# Patient Record
Sex: FEMALE | Race: WHITE | Employment: OTHER | URBAN - METROPOLITAN AREA
[De-identification: names, ages, dates, MRNs, and addresses within clinical notes are randomized per-mention and may not be internally consistent; named-entity substitution may affect disease eponyms.]

---

## 2019-01-01 ENCOUNTER — APPOINTMENT (OUTPATIENT)
Dept: CT IMAGING | Facility: CLINIC | Age: 84
DRG: 023 | End: 2019-01-01
Attending: PSYCHIATRY & NEUROLOGY
Payer: COMMERCIAL

## 2019-01-01 ENCOUNTER — APPOINTMENT (OUTPATIENT)
Dept: INTERVENTIONAL RADIOLOGY/VASCULAR | Facility: CLINIC | Age: 84
DRG: 023 | End: 2019-01-01
Attending: PSYCHIATRY & NEUROLOGY
Payer: COMMERCIAL

## 2019-01-01 ENCOUNTER — APPOINTMENT (OUTPATIENT)
Dept: CT IMAGING | Facility: CLINIC | Age: 84
End: 2019-01-01
Attending: EMERGENCY MEDICINE
Payer: COMMERCIAL

## 2019-01-01 ENCOUNTER — APPOINTMENT (OUTPATIENT)
Dept: MRI IMAGING | Facility: CLINIC | Age: 84
DRG: 023 | End: 2019-01-01
Attending: INTERNAL MEDICINE
Payer: COMMERCIAL

## 2019-01-01 ENCOUNTER — APPOINTMENT (OUTPATIENT)
Dept: CARDIOLOGY | Facility: CLINIC | Age: 84
DRG: 023 | End: 2019-01-01
Attending: INTERNAL MEDICINE
Payer: COMMERCIAL

## 2019-01-01 ENCOUNTER — ANESTHESIA (OUTPATIENT)
Dept: INTERVENTIONAL RADIOLOGY/VASCULAR | Facility: CLINIC | Age: 84
DRG: 023 | End: 2019-01-01
Payer: COMMERCIAL

## 2019-01-01 ENCOUNTER — DOCUMENTATION ONLY (OUTPATIENT)
Dept: OTHER | Facility: CLINIC | Age: 84
End: 2019-01-01

## 2019-01-01 ENCOUNTER — APPOINTMENT (OUTPATIENT)
Dept: GENERAL RADIOLOGY | Facility: CLINIC | Age: 84
DRG: 023 | End: 2019-01-01
Attending: SURGERY
Payer: COMMERCIAL

## 2019-01-01 ENCOUNTER — APPOINTMENT (OUTPATIENT)
Dept: GENERAL RADIOLOGY | Facility: CLINIC | Age: 84
End: 2019-01-01
Attending: EMERGENCY MEDICINE
Payer: COMMERCIAL

## 2019-01-01 ENCOUNTER — HOSPITAL ENCOUNTER (EMERGENCY)
Facility: CLINIC | Age: 84
Discharge: ANOTHER HOSPITAL | End: 2019-08-03
Attending: EMERGENCY MEDICINE | Admitting: EMERGENCY MEDICINE
Payer: COMMERCIAL

## 2019-01-01 ENCOUNTER — HOSPITAL ENCOUNTER (INPATIENT)
Facility: CLINIC | Age: 84
LOS: 9 days | DRG: 023 | End: 2019-08-13
Attending: INTERNAL MEDICINE | Admitting: INTERNAL MEDICINE
Payer: COMMERCIAL

## 2019-01-01 VITALS
SYSTOLIC BLOOD PRESSURE: 150 MMHG | DIASTOLIC BLOOD PRESSURE: 100 MMHG | HEART RATE: 112 BPM | OXYGEN SATURATION: 100 % | RESPIRATION RATE: 18 BRPM | WEIGHT: 155 LBS | TEMPERATURE: 97 F

## 2019-01-01 VITALS
SYSTOLIC BLOOD PRESSURE: 107 MMHG | HEIGHT: 64 IN | OXYGEN SATURATION: 88 % | DIASTOLIC BLOOD PRESSURE: 30 MMHG | RESPIRATION RATE: 42 BRPM | BODY MASS INDEX: 29.17 KG/M2 | HEART RATE: 106 BPM | WEIGHT: 170.86 LBS | TEMPERATURE: 98.1 F

## 2019-01-01 DIAGNOSIS — I63.412 CEREBROVASCULAR ACCIDENT (CVA) DUE TO EMBOLISM OF LEFT MIDDLE CEREBRAL ARTERY (H): ICD-10-CM

## 2019-01-01 LAB
ALBUMIN SERPL-MCNC: 3.1 G/DL (ref 3.4–5)
ALBUMIN UR-MCNC: 100 MG/DL
ALP SERPL-CCNC: 60 U/L (ref 40–150)
ALT SERPL W P-5'-P-CCNC: 18 U/L (ref 0–50)
ANION GAP SERPL CALCULATED.3IONS-SCNC: 5 MMOL/L (ref 3–14)
ANION GAP SERPL CALCULATED.3IONS-SCNC: 6 MMOL/L (ref 3–14)
ANION GAP SERPL CALCULATED.3IONS-SCNC: 7 MMOL/L (ref 3–14)
ANION GAP SERPL CALCULATED.3IONS-SCNC: 8 MMOL/L (ref 3–14)
ANION GAP SERPL CALCULATED.3IONS-SCNC: 9 MMOL/L (ref 3–14)
APPEARANCE UR: ABNORMAL
AST SERPL W P-5'-P-CCNC: 30 U/L (ref 0–45)
BACTERIA SPEC CULT: ABNORMAL
BASE EXCESS BLDV CALC-SCNC: 0.2 MMOL/L
BASE EXCESS BLDV CALC-SCNC: 0.4 MMOL/L
BASE EXCESS BLDV CALC-SCNC: 3.8 MMOL/L
BASOPHILS # BLD AUTO: 0.1 10E9/L (ref 0–0.2)
BASOPHILS NFR BLD AUTO: 0.5 %
BILIRUB SERPL-MCNC: 0.5 MG/DL (ref 0.2–1.3)
BILIRUB UR QL STRIP: NEGATIVE
BUN SERPL-MCNC: 13 MG/DL (ref 7–30)
BUN SERPL-MCNC: 19 MG/DL (ref 7–30)
BUN SERPL-MCNC: 20 MG/DL (ref 7–30)
BUN SERPL-MCNC: 21 MG/DL (ref 7–30)
BUN SERPL-MCNC: 26 MG/DL (ref 7–30)
CALCIUM SERPL-MCNC: 8.2 MG/DL (ref 8.5–10.1)
CALCIUM SERPL-MCNC: 8.4 MG/DL (ref 8.5–10.1)
CALCIUM SERPL-MCNC: 8.6 MG/DL (ref 8.5–10.1)
CALCIUM SERPL-MCNC: 8.9 MG/DL (ref 8.5–10.1)
CALCIUM SERPL-MCNC: 9.2 MG/DL (ref 8.5–10.1)
CHLORIDE SERPL-SCNC: 109 MMOL/L (ref 94–109)
CHLORIDE SERPL-SCNC: 109 MMOL/L (ref 94–109)
CHLORIDE SERPL-SCNC: 110 MMOL/L (ref 94–109)
CHLORIDE SERPL-SCNC: 110 MMOL/L (ref 94–109)
CHLORIDE SERPL-SCNC: 112 MMOL/L (ref 94–109)
CHLORIDE SERPL-SCNC: 115 MMOL/L (ref 94–109)
CHLORIDE SERPL-SCNC: 117 MMOL/L (ref 94–109)
CO2 SERPL-SCNC: 24 MMOL/L (ref 20–32)
CO2 SERPL-SCNC: 25 MMOL/L (ref 20–32)
CO2 SERPL-SCNC: 28 MMOL/L (ref 20–32)
CO2 SERPL-SCNC: 28 MMOL/L (ref 20–32)
COLOR UR AUTO: YELLOW
CREAT SERPL-MCNC: 0.77 MG/DL (ref 0.52–1.04)
CREAT SERPL-MCNC: 0.81 MG/DL (ref 0.52–1.04)
CREAT SERPL-MCNC: 0.84 MG/DL (ref 0.52–1.04)
CREAT SERPL-MCNC: 0.86 MG/DL (ref 0.52–1.04)
CREAT SERPL-MCNC: 0.91 MG/DL (ref 0.52–1.04)
CREAT SERPL-MCNC: 1 MG/DL (ref 0.52–1.04)
CREAT SERPL-MCNC: 1.06 MG/DL (ref 0.52–1.04)
DIFFERENTIAL METHOD BLD: ABNORMAL
EOSINOPHIL # BLD AUTO: 0.2 10E9/L (ref 0–0.7)
EOSINOPHIL NFR BLD AUTO: 1.6 %
ERYTHROCYTE [DISTWIDTH] IN BLOOD BY AUTOMATED COUNT: 16 % (ref 10–15)
ERYTHROCYTE [DISTWIDTH] IN BLOOD BY AUTOMATED COUNT: 16.2 % (ref 10–15)
ERYTHROCYTE [DISTWIDTH] IN BLOOD BY AUTOMATED COUNT: 16.3 % (ref 10–15)
ERYTHROCYTE [DISTWIDTH] IN BLOOD BY AUTOMATED COUNT: 16.5 % (ref 10–15)
ERYTHROCYTE [DISTWIDTH] IN BLOOD BY AUTOMATED COUNT: 16.6 % (ref 10–15)
ERYTHROCYTE [DISTWIDTH] IN BLOOD BY AUTOMATED COUNT: 16.8 % (ref 10–15)
ERYTHROCYTE [DISTWIDTH] IN BLOOD BY AUTOMATED COUNT: 16.9 % (ref 10–15)
GFR SERPL CREATININE-BSD FRML MDRD: 48 ML/MIN/{1.73_M2}
GFR SERPL CREATININE-BSD FRML MDRD: 52 ML/MIN/{1.73_M2}
GFR SERPL CREATININE-BSD FRML MDRD: 58 ML/MIN/{1.73_M2}
GFR SERPL CREATININE-BSD FRML MDRD: 62 ML/MIN/{1.73_M2}
GFR SERPL CREATININE-BSD FRML MDRD: 64 ML/MIN/{1.73_M2}
GFR SERPL CREATININE-BSD FRML MDRD: 66 ML/MIN/{1.73_M2}
GFR SERPL CREATININE-BSD FRML MDRD: 71 ML/MIN/{1.73_M2}
GLUCOSE BLDC GLUCOMTR-MCNC: 102 MG/DL (ref 70–99)
GLUCOSE BLDC GLUCOMTR-MCNC: 108 MG/DL (ref 70–99)
GLUCOSE BLDC GLUCOMTR-MCNC: 108 MG/DL (ref 70–99)
GLUCOSE BLDC GLUCOMTR-MCNC: 109 MG/DL (ref 70–99)
GLUCOSE BLDC GLUCOMTR-MCNC: 110 MG/DL (ref 70–99)
GLUCOSE BLDC GLUCOMTR-MCNC: 112 MG/DL (ref 70–99)
GLUCOSE BLDC GLUCOMTR-MCNC: 113 MG/DL (ref 70–99)
GLUCOSE BLDC GLUCOMTR-MCNC: 116 MG/DL (ref 70–99)
GLUCOSE BLDC GLUCOMTR-MCNC: 116 MG/DL (ref 70–99)
GLUCOSE BLDC GLUCOMTR-MCNC: 117 MG/DL (ref 70–99)
GLUCOSE BLDC GLUCOMTR-MCNC: 118 MG/DL (ref 70–99)
GLUCOSE BLDC GLUCOMTR-MCNC: 119 MG/DL (ref 70–99)
GLUCOSE BLDC GLUCOMTR-MCNC: 121 MG/DL (ref 70–99)
GLUCOSE BLDC GLUCOMTR-MCNC: 124 MG/DL (ref 70–99)
GLUCOSE BLDC GLUCOMTR-MCNC: 126 MG/DL (ref 70–99)
GLUCOSE BLDC GLUCOMTR-MCNC: 127 MG/DL (ref 70–99)
GLUCOSE BLDC GLUCOMTR-MCNC: 127 MG/DL (ref 70–99)
GLUCOSE BLDC GLUCOMTR-MCNC: 129 MG/DL (ref 70–99)
GLUCOSE BLDC GLUCOMTR-MCNC: 130 MG/DL (ref 70–99)
GLUCOSE BLDC GLUCOMTR-MCNC: 130 MG/DL (ref 70–99)
GLUCOSE BLDC GLUCOMTR-MCNC: 131 MG/DL (ref 70–99)
GLUCOSE BLDC GLUCOMTR-MCNC: 131 MG/DL (ref 70–99)
GLUCOSE BLDC GLUCOMTR-MCNC: 134 MG/DL (ref 70–99)
GLUCOSE BLDC GLUCOMTR-MCNC: 134 MG/DL (ref 70–99)
GLUCOSE BLDC GLUCOMTR-MCNC: 136 MG/DL (ref 70–99)
GLUCOSE BLDC GLUCOMTR-MCNC: 137 MG/DL (ref 70–99)
GLUCOSE BLDC GLUCOMTR-MCNC: 137 MG/DL (ref 70–99)
GLUCOSE BLDC GLUCOMTR-MCNC: 140 MG/DL (ref 70–99)
GLUCOSE BLDC GLUCOMTR-MCNC: 142 MG/DL (ref 70–99)
GLUCOSE BLDC GLUCOMTR-MCNC: 146 MG/DL (ref 70–99)
GLUCOSE BLDC GLUCOMTR-MCNC: 146 MG/DL (ref 70–99)
GLUCOSE BLDC GLUCOMTR-MCNC: 147 MG/DL (ref 70–99)
GLUCOSE BLDC GLUCOMTR-MCNC: 148 MG/DL (ref 70–99)
GLUCOSE BLDC GLUCOMTR-MCNC: 151 MG/DL (ref 70–99)
GLUCOSE BLDC GLUCOMTR-MCNC: 151 MG/DL (ref 70–99)
GLUCOSE BLDC GLUCOMTR-MCNC: 153 MG/DL (ref 70–99)
GLUCOSE BLDC GLUCOMTR-MCNC: 153 MG/DL (ref 70–99)
GLUCOSE BLDC GLUCOMTR-MCNC: 156 MG/DL (ref 70–99)
GLUCOSE BLDC GLUCOMTR-MCNC: 164 MG/DL (ref 70–99)
GLUCOSE BLDC GLUCOMTR-MCNC: 165 MG/DL (ref 70–99)
GLUCOSE BLDC GLUCOMTR-MCNC: 166 MG/DL (ref 70–99)
GLUCOSE BLDC GLUCOMTR-MCNC: 169 MG/DL (ref 70–99)
GLUCOSE BLDC GLUCOMTR-MCNC: 170 MG/DL (ref 70–99)
GLUCOSE BLDC GLUCOMTR-MCNC: 170 MG/DL (ref 70–99)
GLUCOSE BLDC GLUCOMTR-MCNC: 172 MG/DL (ref 70–99)
GLUCOSE BLDC GLUCOMTR-MCNC: 173 MG/DL (ref 70–99)
GLUCOSE BLDC GLUCOMTR-MCNC: 178 MG/DL (ref 70–99)
GLUCOSE BLDC GLUCOMTR-MCNC: 190 MG/DL (ref 70–99)
GLUCOSE BLDC GLUCOMTR-MCNC: 191 MG/DL (ref 70–99)
GLUCOSE BLDC GLUCOMTR-MCNC: 196 MG/DL (ref 70–99)
GLUCOSE BLDC GLUCOMTR-MCNC: 197 MG/DL (ref 70–99)
GLUCOSE BLDC GLUCOMTR-MCNC: 201 MG/DL (ref 70–99)
GLUCOSE BLDC GLUCOMTR-MCNC: 204 MG/DL (ref 70–99)
GLUCOSE BLDC GLUCOMTR-MCNC: 209 MG/DL (ref 70–99)
GLUCOSE BLDC GLUCOMTR-MCNC: 216 MG/DL (ref 70–99)
GLUCOSE BLDC GLUCOMTR-MCNC: 217 MG/DL (ref 70–99)
GLUCOSE BLDC GLUCOMTR-MCNC: 220 MG/DL (ref 70–99)
GLUCOSE BLDC GLUCOMTR-MCNC: 226 MG/DL (ref 70–99)
GLUCOSE BLDC GLUCOMTR-MCNC: 226 MG/DL (ref 70–99)
GLUCOSE BLDC GLUCOMTR-MCNC: 228 MG/DL (ref 70–99)
GLUCOSE BLDC GLUCOMTR-MCNC: 233 MG/DL (ref 70–99)
GLUCOSE BLDC GLUCOMTR-MCNC: 233 MG/DL (ref 70–99)
GLUCOSE BLDC GLUCOMTR-MCNC: 235 MG/DL (ref 70–99)
GLUCOSE BLDC GLUCOMTR-MCNC: 240 MG/DL (ref 70–99)
GLUCOSE BLDC GLUCOMTR-MCNC: 242 MG/DL (ref 70–99)
GLUCOSE BLDC GLUCOMTR-MCNC: 248 MG/DL (ref 70–99)
GLUCOSE BLDC GLUCOMTR-MCNC: 269 MG/DL (ref 70–99)
GLUCOSE BLDC GLUCOMTR-MCNC: 272 MG/DL (ref 70–99)
GLUCOSE BLDC GLUCOMTR-MCNC: 92 MG/DL (ref 70–99)
GLUCOSE BLDC GLUCOMTR-MCNC: 93 MG/DL (ref 70–99)
GLUCOSE BLDC GLUCOMTR-MCNC: 94 MG/DL (ref 70–99)
GLUCOSE BLDC GLUCOMTR-MCNC: 94 MG/DL (ref 70–99)
GLUCOSE BLDC GLUCOMTR-MCNC: 97 MG/DL (ref 70–99)
GLUCOSE SERPL-MCNC: 108 MG/DL (ref 70–99)
GLUCOSE SERPL-MCNC: 130 MG/DL (ref 70–99)
GLUCOSE SERPL-MCNC: 154 MG/DL (ref 70–99)
GLUCOSE SERPL-MCNC: 155 MG/DL (ref 70–99)
GLUCOSE SERPL-MCNC: 187 MG/DL (ref 70–99)
GLUCOSE SERPL-MCNC: 236 MG/DL (ref 70–99)
GLUCOSE SERPL-MCNC: 240 MG/DL (ref 70–99)
GLUCOSE UR STRIP-MCNC: >499 MG/DL
GRAM STN SPEC: ABNORMAL
GRAM STN SPEC: ABNORMAL
HBA1C MFR BLD: 7.1 % (ref 0–5.6)
HCO3 BLDV-SCNC: 24 MMOL/L (ref 21–28)
HCO3 BLDV-SCNC: 27 MMOL/L (ref 21–28)
HCO3 BLDV-SCNC: 28 MMOL/L (ref 21–28)
HCT VFR BLD AUTO: 33.4 % (ref 35–47)
HCT VFR BLD AUTO: 34 % (ref 35–47)
HCT VFR BLD AUTO: 34.3 % (ref 35–47)
HCT VFR BLD AUTO: 35.9 % (ref 35–47)
HCT VFR BLD AUTO: 39.5 % (ref 35–47)
HGB BLD-MCNC: 10.3 G/DL (ref 11.7–15.7)
HGB BLD-MCNC: 10.4 G/DL (ref 11.7–15.7)
HGB BLD-MCNC: 10.5 G/DL (ref 11.7–15.7)
HGB BLD-MCNC: 10.6 G/DL (ref 11.7–15.7)
HGB BLD-MCNC: 10.6 G/DL (ref 11.7–15.7)
HGB BLD-MCNC: 11.3 G/DL (ref 11.7–15.7)
HGB BLD-MCNC: 11.9 G/DL (ref 11.7–15.7)
HGB UR QL STRIP: ABNORMAL
IMM GRANULOCYTES # BLD: 0.1 10E9/L (ref 0–0.4)
IMM GRANULOCYTES NFR BLD: 0.9 %
INR PPP: 1.09 (ref 0.86–1.14)
INTERPRETATION ECG - MUSE: NORMAL
KETONES UR STRIP-MCNC: NEGATIVE MG/DL
LEUKOCYTE ESTERASE UR QL STRIP: ABNORMAL
LYMPHOCYTES # BLD AUTO: 1.7 10E9/L (ref 0.8–5.3)
LYMPHOCYTES NFR BLD AUTO: 16.6 %
Lab: ABNORMAL
MAGNESIUM SERPL-MCNC: 1.7 MG/DL (ref 1.6–2.3)
MAGNESIUM SERPL-MCNC: 1.9 MG/DL (ref 1.6–2.3)
MAGNESIUM SERPL-MCNC: 2 MG/DL (ref 1.6–2.3)
MAGNESIUM SERPL-MCNC: 2.2 MG/DL (ref 1.6–2.3)
MCH RBC QN AUTO: 27.9 PG (ref 26.5–33)
MCH RBC QN AUTO: 28 PG (ref 26.5–33)
MCH RBC QN AUTO: 28.2 PG (ref 26.5–33)
MCH RBC QN AUTO: 28.4 PG (ref 26.5–33)
MCH RBC QN AUTO: 28.5 PG (ref 26.5–33)
MCH RBC QN AUTO: 28.6 PG (ref 26.5–33)
MCH RBC QN AUTO: 28.8 PG (ref 26.5–33)
MCHC RBC AUTO-ENTMCNC: 30.1 G/DL (ref 31.5–36.5)
MCHC RBC AUTO-ENTMCNC: 30.8 G/DL (ref 31.5–36.5)
MCHC RBC AUTO-ENTMCNC: 30.9 G/DL (ref 31.5–36.5)
MCHC RBC AUTO-ENTMCNC: 31.1 G/DL (ref 31.5–36.5)
MCHC RBC AUTO-ENTMCNC: 31.2 G/DL (ref 31.5–36.5)
MCHC RBC AUTO-ENTMCNC: 31.4 G/DL (ref 31.5–36.5)
MCHC RBC AUTO-ENTMCNC: 31.5 G/DL (ref 31.5–36.5)
MCV RBC AUTO: 91 FL (ref 78–100)
MCV RBC AUTO: 92 FL (ref 78–100)
MCV RBC AUTO: 93 FL (ref 78–100)
MONOCYTES # BLD AUTO: 0.9 10E9/L (ref 0–1.3)
MONOCYTES NFR BLD AUTO: 8.7 %
MRSA DNA SPEC QL NAA+PROBE: NEGATIVE
MUCOUS THREADS #/AREA URNS LPF: PRESENT /LPF
NEUTROPHILS # BLD AUTO: 7.3 10E9/L (ref 1.6–8.3)
NEUTROPHILS NFR BLD AUTO: 71.7 %
NITRATE UR QL: POSITIVE
NRBC # BLD AUTO: 0 10*3/UL
NRBC BLD AUTO-RTO: 0 /100
O2/TOTAL GAS SETTING VFR VENT: ABNORMAL %
OXYHGB MFR BLDV: 60 %
OXYHGB MFR BLDV: 88 %
PCO2 BLDV: 37 MM HG (ref 40–50)
PCO2 BLDV: 39 MM HG (ref 40–50)
PCO2 BLDV: 52 MM HG (ref 40–50)
PH BLDV: 7.33 PH (ref 7.32–7.43)
PH BLDV: 7.42 PH (ref 7.32–7.43)
PH BLDV: 7.46 PH (ref 7.32–7.43)
PH UR STRIP: 5 PH (ref 5–7)
PHOSPHATE SERPL-MCNC: 1.9 MG/DL (ref 2.5–4.5)
PHOSPHATE SERPL-MCNC: 2.4 MG/DL (ref 2.5–4.5)
PHOSPHATE SERPL-MCNC: 2.8 MG/DL (ref 2.5–4.5)
PHOSPHATE SERPL-MCNC: 2.8 MG/DL (ref 2.5–4.5)
PHOSPHATE SERPL-MCNC: 3.3 MG/DL (ref 2.5–4.5)
PHOSPHATE SERPL-MCNC: 4.3 MG/DL (ref 2.5–4.5)
PLATELET # BLD AUTO: 250 10E9/L (ref 150–450)
PLATELET # BLD AUTO: 292 10E9/L (ref 150–450)
PLATELET # BLD AUTO: 298 10E9/L (ref 150–450)
PLATELET # BLD AUTO: 300 10E9/L (ref 150–450)
PLATELET # BLD AUTO: 300 10E9/L (ref 150–450)
PLATELET # BLD AUTO: 306 10E9/L (ref 150–450)
PLATELET # BLD AUTO: 319 10E9/L (ref 150–450)
PO2 BLDV: 36 MM HG (ref 25–47)
PO2 BLDV: 46 MM HG (ref 25–47)
PO2 BLDV: 56 MM HG (ref 25–47)
POTASSIUM SERPL-SCNC: 3.3 MMOL/L (ref 3.4–5.3)
POTASSIUM SERPL-SCNC: 3.4 MMOL/L (ref 3.4–5.3)
POTASSIUM SERPL-SCNC: 3.4 MMOL/L (ref 3.4–5.3)
POTASSIUM SERPL-SCNC: 3.5 MMOL/L (ref 3.4–5.3)
POTASSIUM SERPL-SCNC: 3.5 MMOL/L (ref 3.4–5.3)
POTASSIUM SERPL-SCNC: 3.6 MMOL/L (ref 3.4–5.3)
POTASSIUM SERPL-SCNC: 3.7 MMOL/L (ref 3.4–5.3)
PROT SERPL-MCNC: 6.8 G/DL (ref 6.8–8.8)
RBC # BLD AUTO: 3.64 10E12/L (ref 3.8–5.2)
RBC # BLD AUTO: 3.65 10E12/L (ref 3.8–5.2)
RBC # BLD AUTO: 3.65 10E12/L (ref 3.8–5.2)
RBC # BLD AUTO: 3.73 10E12/L (ref 3.8–5.2)
RBC # BLD AUTO: 3.78 10E12/L (ref 3.8–5.2)
RBC # BLD AUTO: 3.96 10E12/L (ref 3.8–5.2)
RBC # BLD AUTO: 4.27 10E12/L (ref 3.8–5.2)
RBC #/AREA URNS AUTO: 12 /HPF (ref 0–2)
SODIUM SERPL-SCNC: 141 MMOL/L (ref 133–144)
SODIUM SERPL-SCNC: 143 MMOL/L (ref 133–144)
SODIUM SERPL-SCNC: 144 MMOL/L (ref 133–144)
SODIUM SERPL-SCNC: 147 MMOL/L (ref 133–144)
SODIUM SERPL-SCNC: 150 MMOL/L (ref 133–144)
SOURCE: ABNORMAL
SP GR UR STRIP: 1.03 (ref 1–1.03)
SPECIMEN SOURCE: ABNORMAL
SPECIMEN SOURCE: ABNORMAL
SPECIMEN SOURCE: NORMAL
SQUAMOUS #/AREA URNS AUTO: 3 /HPF (ref 0–1)
TROPONIN I SERPL-MCNC: 0.02 UG/L (ref 0–0.04)
TROPONIN I SERPL-MCNC: <0.015 UG/L (ref 0–0.04)
UROBILINOGEN UR STRIP-MCNC: 0 MG/DL (ref 0–2)
WBC # BLD AUTO: 10.1 10E9/L (ref 4–11)
WBC # BLD AUTO: 12.5 10E9/L (ref 4–11)
WBC # BLD AUTO: 12.9 10E9/L (ref 4–11)
WBC # BLD AUTO: 15.8 10E9/L (ref 4–11)
WBC # BLD AUTO: 16.4 10E9/L (ref 4–11)
WBC # BLD AUTO: 18.5 10E9/L (ref 4–11)
WBC # BLD AUTO: 23 10E9/L (ref 4–11)
WBC #/AREA URNS AUTO: 2635 /HPF (ref 0–5)
WBC CLUMPS #/AREA URNS HPF: PRESENT /HPF

## 2019-01-01 PROCEDURE — 99207 ZZC NON-BILLABLE SERV PER CHARTING: CPT | Performed by: INTERNAL MEDICINE

## 2019-01-01 PROCEDURE — 80048 BASIC METABOLIC PNL TOTAL CA: CPT | Performed by: INTERNAL MEDICINE

## 2019-01-01 PROCEDURE — 27210738 ZZH ACCESSORY CR2

## 2019-01-01 PROCEDURE — 27210886 ZZH ACCESSORY CR5

## 2019-01-01 PROCEDURE — 87186 SC STD MICRODIL/AGAR DIL: CPT | Performed by: EMERGENCY MEDICINE

## 2019-01-01 PROCEDURE — 25800030 ZZH RX IP 258 OP 636: Performed by: NURSE PRACTITIONER

## 2019-01-01 PROCEDURE — 25000128 H RX IP 250 OP 636: Performed by: INTERNAL MEDICINE

## 2019-01-01 PROCEDURE — 25000132 ZZH RX MED GY IP 250 OP 250 PS 637: Performed by: STUDENT IN AN ORGANIZED HEALTH CARE EDUCATION/TRAINING PROGRAM

## 2019-01-01 PROCEDURE — 84100 ASSAY OF PHOSPHORUS: CPT | Performed by: INTERNAL MEDICINE

## 2019-01-01 PROCEDURE — 27210429 ZZH NUTRITION PRODUCT INTERMEDIATE LITER

## 2019-01-01 PROCEDURE — 96365 THER/PROPH/DIAG IV INF INIT: CPT | Mod: 59 | Performed by: EMERGENCY MEDICINE

## 2019-01-01 PROCEDURE — 40000275 ZZH STATISTIC RCP TIME EA 10 MIN

## 2019-01-01 PROCEDURE — 99291 CRITICAL CARE FIRST HOUR: CPT | Performed by: INTERNAL MEDICINE

## 2019-01-01 PROCEDURE — 40000281 ZZH STATISTIC TRANSPORT TIME EA 15 MIN

## 2019-01-01 PROCEDURE — 40000264 ECHOCARDIOGRAM COMPLETE

## 2019-01-01 PROCEDURE — 83735 ASSAY OF MAGNESIUM: CPT | Performed by: INTERNAL MEDICINE

## 2019-01-01 PROCEDURE — 25800030 ZZH RX IP 258 OP 636: Performed by: NURSE ANESTHETIST, CERTIFIED REGISTERED

## 2019-01-01 PROCEDURE — 25000132 ZZH RX MED GY IP 250 OP 250 PS 637: Performed by: NURSE PRACTITIONER

## 2019-01-01 PROCEDURE — 99232 SBSQ HOSP IP/OBS MODERATE 35: CPT | Mod: GC | Performed by: PSYCHIATRY & NEUROLOGY

## 2019-01-01 PROCEDURE — 27211192 ZZ H SHEATH CR14

## 2019-01-01 PROCEDURE — 70450 CT HEAD/BRAIN W/O DYE: CPT | Mod: 76

## 2019-01-01 PROCEDURE — 36415 COLL VENOUS BLD VENIPUNCTURE: CPT | Performed by: SURGERY

## 2019-01-01 PROCEDURE — 40000008 ZZH STATISTIC AIRWAY CARE

## 2019-01-01 PROCEDURE — 82805 BLOOD GASES W/O2 SATURATION: CPT | Performed by: SURGERY

## 2019-01-01 PROCEDURE — 84484 ASSAY OF TROPONIN QUANT: CPT | Performed by: EMERGENCY MEDICINE

## 2019-01-01 PROCEDURE — 25000128 H RX IP 250 OP 636: Performed by: STUDENT IN AN ORGANIZED HEALTH CARE EDUCATION/TRAINING PROGRAM

## 2019-01-01 PROCEDURE — 25000125 ZZHC RX 250: Performed by: INTERNAL MEDICINE

## 2019-01-01 PROCEDURE — 36415 COLL VENOUS BLD VENIPUNCTURE: CPT | Performed by: INTERNAL MEDICINE

## 2019-01-01 PROCEDURE — 99233 SBSQ HOSP IP/OBS HIGH 50: CPT | Mod: GC | Performed by: PSYCHIATRY & NEUROLOGY

## 2019-01-01 PROCEDURE — 25800030 ZZH RX IP 258 OP 636: Performed by: INTERNAL MEDICINE

## 2019-01-01 PROCEDURE — 25000128 H RX IP 250 OP 636: Performed by: NURSE ANESTHETIST, CERTIFIED REGISTERED

## 2019-01-01 PROCEDURE — 82803 BLOOD GASES ANY COMBINATION: CPT | Performed by: EMERGENCY MEDICINE

## 2019-01-01 PROCEDURE — 99292 CRITICAL CARE ADDL 30 MIN: CPT | Mod: 25 | Performed by: EMERGENCY MEDICINE

## 2019-01-01 PROCEDURE — 93005 ELECTROCARDIOGRAM TRACING: CPT | Performed by: EMERGENCY MEDICINE

## 2019-01-01 PROCEDURE — 40000986 XR CHEST PORT 1 VW

## 2019-01-01 PROCEDURE — 20000003 ZZH R&B ICU

## 2019-01-01 PROCEDURE — 70450 CT HEAD/BRAIN W/O DYE: CPT | Mod: XS

## 2019-01-01 PROCEDURE — 25000125 ZZHC RX 250: Performed by: NURSE ANESTHETIST, CERTIFIED REGISTERED

## 2019-01-01 PROCEDURE — C1769 GUIDE WIRE: HCPCS

## 2019-01-01 PROCEDURE — 94003 VENT MGMT INPAT SUBQ DAY: CPT

## 2019-01-01 PROCEDURE — 25000128 H RX IP 250 OP 636: Performed by: EMERGENCY MEDICINE

## 2019-01-01 PROCEDURE — 25000125 ZZHC RX 250: Performed by: NURSE PRACTITIONER

## 2019-01-01 PROCEDURE — 87205 SMEAR GRAM STAIN: CPT | Performed by: INTERNAL MEDICINE

## 2019-01-01 PROCEDURE — 25000125 ZZHC RX 250: Performed by: SURGERY

## 2019-01-01 PROCEDURE — 94002 VENT MGMT INPAT INIT DAY: CPT

## 2019-01-01 PROCEDURE — 25000132 ZZH RX MED GY IP 250 OP 250 PS 637: Performed by: INTERNAL MEDICINE

## 2019-01-01 PROCEDURE — 70450 CT HEAD/BRAIN W/O DYE: CPT

## 2019-01-01 PROCEDURE — C1760 CLOSURE DEV, VASC: HCPCS

## 2019-01-01 PROCEDURE — C9113 INJ PANTOPRAZOLE SODIUM, VIA: HCPCS | Performed by: INTERNAL MEDICINE

## 2019-01-01 PROCEDURE — 25000132 ZZH RX MED GY IP 250 OP 250 PS 637

## 2019-01-01 PROCEDURE — 40000986 XR ABDOMEN PORT 1 VW

## 2019-01-01 PROCEDURE — 96376 TX/PRO/DX INJ SAME DRUG ADON: CPT | Mod: 59 | Performed by: EMERGENCY MEDICINE

## 2019-01-01 PROCEDURE — 25000131 ZZH RX MED GY IP 250 OP 636 PS 637: Performed by: INTERNAL MEDICINE

## 2019-01-01 PROCEDURE — 25000125 ZZHC RX 250

## 2019-01-01 PROCEDURE — 25000125 ZZHC RX 250: Performed by: STUDENT IN AN ORGANIZED HEALTH CARE EDUCATION/TRAINING PROGRAM

## 2019-01-01 PROCEDURE — 00000146 ZZHCL STATISTIC GLUCOSE BY METER IP

## 2019-01-01 PROCEDURE — 99153 MOD SED SAME PHYS/QHP EA: CPT | Performed by: EMERGENCY MEDICINE

## 2019-01-01 PROCEDURE — 25500064 ZZH RX 255 OP 636: Performed by: INTERNAL MEDICINE

## 2019-01-01 PROCEDURE — 85027 COMPLETE CBC AUTOMATED: CPT | Performed by: INTERNAL MEDICINE

## 2019-01-01 PROCEDURE — 99291 CRITICAL CARE FIRST HOUR: CPT

## 2019-01-01 PROCEDURE — 99223 1ST HOSP IP/OBS HIGH 75: CPT | Performed by: NURSE PRACTITIONER

## 2019-01-01 PROCEDURE — 31500 INSERT EMERGENCY AIRWAY: CPT | Mod: Z6 | Performed by: EMERGENCY MEDICINE

## 2019-01-01 PROCEDURE — 12000000 ZZH R&B MED SURG/OB

## 2019-01-01 PROCEDURE — 99153 MOD SED SAME PHYS/QHP EA: CPT | Mod: Z6 | Performed by: EMERGENCY MEDICINE

## 2019-01-01 PROCEDURE — 99238 HOSP IP/OBS DSCHRG MGMT 30/<: CPT | Performed by: NURSE PRACTITIONER

## 2019-01-01 PROCEDURE — 93306 TTE W/DOPPLER COMPLETE: CPT | Mod: 26 | Performed by: INTERNAL MEDICINE

## 2019-01-01 PROCEDURE — 87106 FUNGI IDENTIFICATION YEAST: CPT | Performed by: INTERNAL MEDICINE

## 2019-01-01 PROCEDURE — 84484 ASSAY OF TROPONIN QUANT: CPT | Performed by: INTERNAL MEDICINE

## 2019-01-01 PROCEDURE — 87086 URINE CULTURE/COLONY COUNT: CPT | Performed by: EMERGENCY MEDICINE

## 2019-01-01 PROCEDURE — 96375 TX/PRO/DX INJ NEW DRUG ADDON: CPT | Performed by: EMERGENCY MEDICINE

## 2019-01-01 PROCEDURE — 80053 COMPREHEN METABOLIC PANEL: CPT | Performed by: EMERGENCY MEDICINE

## 2019-01-01 PROCEDURE — 81001 URINALYSIS AUTO W/SCOPE: CPT | Performed by: EMERGENCY MEDICINE

## 2019-01-01 PROCEDURE — 25800030 ZZH RX IP 258 OP 636: Performed by: PSYCHIATRY & NEUROLOGY

## 2019-01-01 PROCEDURE — 70498 CT ANGIOGRAPHY NECK: CPT

## 2019-01-01 PROCEDURE — 99291 CRITICAL CARE FIRST HOUR: CPT | Mod: 25 | Performed by: EMERGENCY MEDICINE

## 2019-01-01 PROCEDURE — 93005 ELECTROCARDIOGRAM TRACING: CPT

## 2019-01-01 PROCEDURE — 31500 INSERT EMERGENCY AIRWAY: CPT | Performed by: EMERGENCY MEDICINE

## 2019-01-01 PROCEDURE — 85025 COMPLETE CBC W/AUTO DIFF WBC: CPT | Performed by: EMERGENCY MEDICINE

## 2019-01-01 PROCEDURE — 99152 MOD SED SAME PHYS/QHP 5/>YRS: CPT | Mod: Z6 | Performed by: EMERGENCY MEDICINE

## 2019-01-01 PROCEDURE — 25800030 ZZH RX IP 258 OP 636: Performed by: STUDENT IN AN ORGANIZED HEALTH CARE EDUCATION/TRAINING PROGRAM

## 2019-01-01 PROCEDURE — 25000128 H RX IP 250 OP 636: Performed by: PSYCHIATRY & NEUROLOGY

## 2019-01-01 PROCEDURE — 87088 URINE BACTERIA CULTURE: CPT | Performed by: EMERGENCY MEDICINE

## 2019-01-01 PROCEDURE — 70496 CT ANGIOGRAPHY HEAD: CPT

## 2019-01-01 PROCEDURE — 99291 CRITICAL CARE FIRST HOUR: CPT | Performed by: PSYCHIATRY & NEUROLOGY

## 2019-01-01 PROCEDURE — 93010 ELECTROCARDIOGRAM REPORT: CPT | Performed by: INTERNAL MEDICINE

## 2019-01-01 PROCEDURE — 70551 MRI BRAIN STEM W/O DYE: CPT

## 2019-01-01 PROCEDURE — 87070 CULTURE OTHR SPECIMN AEROBIC: CPT | Performed by: INTERNAL MEDICINE

## 2019-01-01 PROCEDURE — 94640 AIRWAY INHALATION TREATMENT: CPT

## 2019-01-01 PROCEDURE — 93010 ELECTROCARDIOGRAM REPORT: CPT | Mod: Z6 | Performed by: EMERGENCY MEDICINE

## 2019-01-01 PROCEDURE — 27210742 ZZH CATH CR1

## 2019-01-01 PROCEDURE — 85610 PROTHROMBIN TIME: CPT | Performed by: EMERGENCY MEDICINE

## 2019-01-01 PROCEDURE — 03CG3ZZ EXTIRPATION OF MATTER FROM INTRACRANIAL ARTERY, PERCUTANEOUS APPROACH: ICD-10-PCS | Performed by: PSYCHIATRY & NEUROLOGY

## 2019-01-01 PROCEDURE — 87640 STAPH A DNA AMP PROBE: CPT | Performed by: INTERNAL MEDICINE

## 2019-01-01 PROCEDURE — C1887 CATHETER, GUIDING: HCPCS

## 2019-01-01 PROCEDURE — 5A1945Z RESPIRATORY VENTILATION, 24-96 CONSECUTIVE HOURS: ICD-10-PCS | Performed by: INTERNAL MEDICINE

## 2019-01-01 PROCEDURE — 87641 MR-STAPH DNA AMP PROBE: CPT | Performed by: INTERNAL MEDICINE

## 2019-01-01 PROCEDURE — 27210732 ZZH ACCESSORY CR1

## 2019-01-01 PROCEDURE — 25000125 ZZHC RX 250: Performed by: EMERGENCY MEDICINE

## 2019-01-01 PROCEDURE — 40000141 ZZH STATISTIC PERIPHERAL IV START W/O US GUIDANCE

## 2019-01-01 PROCEDURE — 87077 CULTURE AEROBIC IDENTIFY: CPT | Performed by: INTERNAL MEDICINE

## 2019-01-01 PROCEDURE — 99152 MOD SED SAME PHYS/QHP 5/>YRS: CPT | Performed by: EMERGENCY MEDICINE

## 2019-01-01 PROCEDURE — 82805 BLOOD GASES W/O2 SATURATION: CPT | Performed by: INTERNAL MEDICINE

## 2019-01-01 PROCEDURE — 83036 HEMOGLOBIN GLYCOSYLATED A1C: CPT | Performed by: INTERNAL MEDICINE

## 2019-01-01 RX ORDER — IOPAMIDOL 755 MG/ML
50 INJECTION, SOLUTION INTRAVASCULAR ONCE
Status: COMPLETED | OUTPATIENT
Start: 2019-01-01 | End: 2019-01-01

## 2019-01-01 RX ORDER — CARBOXYMETHYLCELLULOSE SODIUM 5 MG/ML
1 SOLUTION/ DROPS OPHTHALMIC DAILY PRN
COMMUNITY

## 2019-01-01 RX ORDER — LIDOCAINE 40 MG/G
CREAM TOPICAL
Status: DISCONTINUED | OUTPATIENT
Start: 2019-01-01 | End: 2019-01-01 | Stop reason: HOSPADM

## 2019-01-01 RX ORDER — METOPROLOL TARTRATE 1 MG/ML
10 INJECTION, SOLUTION INTRAVENOUS ONCE
Status: COMPLETED | OUTPATIENT
Start: 2019-01-01 | End: 2019-01-01

## 2019-01-01 RX ORDER — HYDROMORPHONE HYDROCHLORIDE 1 MG/ML
0.2 INJECTION, SOLUTION INTRAMUSCULAR; INTRAVENOUS; SUBCUTANEOUS
Status: DISCONTINUED | OUTPATIENT
Start: 2019-01-01 | End: 2019-01-01

## 2019-01-01 RX ORDER — LORAZEPAM 2 MG/ML
1-2 INJECTION INTRAMUSCULAR EVERY 10 MIN PRN
Status: DISCONTINUED | OUTPATIENT
Start: 2019-01-01 | End: 2019-01-01 | Stop reason: HOSPADM

## 2019-01-01 RX ORDER — MICONAZOLE NITRATE 20 MG/G
CREAM TOPICAL
Status: DISCONTINUED | OUTPATIENT
Start: 2019-01-01 | End: 2019-01-01 | Stop reason: HOSPADM

## 2019-01-01 RX ORDER — MORPHINE SULFATE 10 MG/5ML
5-10 SOLUTION ORAL
Status: DISCONTINUED | OUTPATIENT
Start: 2019-01-01 | End: 2019-01-01 | Stop reason: HOSPADM

## 2019-01-01 RX ORDER — PROPOFOL 10 MG/ML
5-75 INJECTION, EMULSION INTRAVENOUS CONTINUOUS
Status: DISCONTINUED | OUTPATIENT
Start: 2019-01-01 | End: 2019-01-01 | Stop reason: HOSPADM

## 2019-01-01 RX ORDER — METOPROLOL TARTRATE 75 MG/1
75 TABLET, FILM COATED ORAL 2 TIMES DAILY
COMMUNITY

## 2019-01-01 RX ORDER — ONDANSETRON 4 MG/1
4 TABLET, ORALLY DISINTEGRATING ORAL EVERY 6 HOURS PRN
Status: DISCONTINUED | OUTPATIENT
Start: 2019-01-01 | End: 2019-01-01

## 2019-01-01 RX ORDER — NICOTINE POLACRILEX 4 MG
15-30 LOZENGE BUCCAL
Status: DISCONTINUED | OUTPATIENT
Start: 2019-01-01 | End: 2019-01-01

## 2019-01-01 RX ORDER — MODAFINIL 100 MG/1
200 TABLET ORAL DAILY
Status: DISCONTINUED | OUTPATIENT
Start: 2019-01-01 | End: 2019-01-01

## 2019-01-01 RX ORDER — HEPARIN SODIUM 200 [USP'U]/100ML
INJECTION, SOLUTION INTRAVENOUS
Status: DISCONTINUED
Start: 2019-01-01 | End: 2019-01-01 | Stop reason: HOSPADM

## 2019-01-01 RX ORDER — HYDRALAZINE HYDROCHLORIDE 20 MG/ML
10-20 INJECTION INTRAMUSCULAR; INTRAVENOUS EVERY 30 MIN PRN
Status: DISCONTINUED | OUTPATIENT
Start: 2019-01-01 | End: 2019-01-01

## 2019-01-01 RX ORDER — SODIUM CHLORIDE 9 MG/ML
INJECTION, SOLUTION INTRAVENOUS CONTINUOUS
Status: DISCONTINUED | OUTPATIENT
Start: 2019-01-01 | End: 2019-01-01

## 2019-01-01 RX ORDER — MAGNESIUM SULFATE HEPTAHYDRATE 40 MG/ML
2 INJECTION, SOLUTION INTRAVENOUS DAILY PRN
Status: DISCONTINUED | OUTPATIENT
Start: 2019-01-01 | End: 2019-01-01

## 2019-01-01 RX ORDER — DEXTROSE MONOHYDRATE 25 G/50ML
25-50 INJECTION, SOLUTION INTRAVENOUS
Status: DISCONTINUED | OUTPATIENT
Start: 2019-01-01 | End: 2019-01-01

## 2019-01-01 RX ORDER — FOLIC ACID 1 MG/1
1 TABLET ORAL DAILY
Status: DISCONTINUED | OUTPATIENT
Start: 2019-01-01 | End: 2019-01-01

## 2019-01-01 RX ORDER — PROPOFOL 10 MG/ML
5-75 INJECTION, EMULSION INTRAVENOUS CONTINUOUS
Status: DISCONTINUED | OUTPATIENT
Start: 2019-01-01 | End: 2019-01-01

## 2019-01-01 RX ORDER — LORAZEPAM 0.5 MG/1
.5-1 TABLET ORAL
Status: DISCONTINUED | OUTPATIENT
Start: 2019-01-01 | End: 2019-01-01

## 2019-01-01 RX ORDER — PIPERACILLIN SODIUM, TAZOBACTAM SODIUM 3; .375 G/15ML; G/15ML
3.38 INJECTION, POWDER, LYOPHILIZED, FOR SOLUTION INTRAVENOUS EVERY 6 HOURS
Status: DISCONTINUED | OUTPATIENT
Start: 2019-01-01 | End: 2019-01-01

## 2019-01-01 RX ORDER — METOPROLOL TARTRATE 1 MG/ML
INJECTION, SOLUTION INTRAVENOUS
Status: COMPLETED
Start: 2019-01-01 | End: 2019-01-01

## 2019-01-01 RX ORDER — MORPHINE SULFATE 100 MG/5ML
5-10 SOLUTION ORAL
Status: DISCONTINUED | OUTPATIENT
Start: 2019-01-01 | End: 2019-01-01 | Stop reason: HOSPADM

## 2019-01-01 RX ORDER — HEPARIN SODIUM 1000 [USP'U]/ML
INJECTION, SOLUTION INTRAVENOUS; SUBCUTANEOUS
Status: DISCONTINUED
Start: 2019-01-01 | End: 2019-01-01 | Stop reason: HOSPADM

## 2019-01-01 RX ORDER — ASPIRIN 81 MG/1
81 TABLET ORAL DAILY
Status: DISCONTINUED | OUTPATIENT
Start: 2019-01-01 | End: 2019-01-01

## 2019-01-01 RX ORDER — LABETALOL HYDROCHLORIDE 5 MG/ML
10-20 INJECTION, SOLUTION INTRAVENOUS EVERY 10 MIN PRN
Status: DISCONTINUED | OUTPATIENT
Start: 2019-01-01 | End: 2019-01-01

## 2019-01-01 RX ORDER — EZETIMIBE 10 MG/1
10 TABLET ORAL DAILY
Status: DISCONTINUED | OUTPATIENT
Start: 2019-01-01 | End: 2019-01-01

## 2019-01-01 RX ORDER — PROPOFOL 10 MG/ML
INJECTION, EMULSION INTRAVENOUS PRN
Status: DISCONTINUED | OUTPATIENT
Start: 2019-01-01 | End: 2019-01-01

## 2019-01-01 RX ORDER — BISACODYL 10 MG
10 SUPPOSITORY, RECTAL RECTAL DAILY PRN
COMMUNITY

## 2019-01-01 RX ORDER — IOPAMIDOL 755 MG/ML
70 INJECTION, SOLUTION INTRAVASCULAR ONCE
Status: COMPLETED | OUTPATIENT
Start: 2019-01-01 | End: 2019-01-01

## 2019-01-01 RX ORDER — SODIUM CHLORIDE, SODIUM LACTATE, POTASSIUM CHLORIDE, CALCIUM CHLORIDE 600; 310; 30; 20 MG/100ML; MG/100ML; MG/100ML; MG/100ML
INJECTION, SOLUTION INTRAVENOUS CONTINUOUS PRN
Status: DISCONTINUED | OUTPATIENT
Start: 2019-01-01 | End: 2019-01-01

## 2019-01-01 RX ORDER — MAGNESIUM SULFATE HEPTAHYDRATE 40 MG/ML
4 INJECTION, SOLUTION INTRAVENOUS EVERY 4 HOURS PRN
Status: DISCONTINUED | OUTPATIENT
Start: 2019-01-01 | End: 2019-01-01

## 2019-01-01 RX ORDER — NALOXONE HYDROCHLORIDE 0.4 MG/ML
.1-.4 INJECTION, SOLUTION INTRAMUSCULAR; INTRAVENOUS; SUBCUTANEOUS
Status: DISCONTINUED | OUTPATIENT
Start: 2019-01-01 | End: 2019-01-01

## 2019-01-01 RX ORDER — POTASSIUM CHLORIDE 1500 MG/1
20-40 TABLET, EXTENDED RELEASE ORAL
Status: DISCONTINUED | OUTPATIENT
Start: 2019-01-01 | End: 2019-01-01

## 2019-01-01 RX ORDER — AMOXICILLIN 250 MG
2 CAPSULE ORAL 2 TIMES DAILY PRN
Status: DISCONTINUED | OUTPATIENT
Start: 2019-01-01 | End: 2019-01-01 | Stop reason: HOSPADM

## 2019-01-01 RX ORDER — POTASSIUM CL/LIDO/0.9 % NACL 10MEQ/0.1L
10 INTRAVENOUS SOLUTION, PIGGYBACK (ML) INTRAVENOUS
Status: DISCONTINUED | OUTPATIENT
Start: 2019-01-01 | End: 2019-01-01

## 2019-01-01 RX ORDER — NALOXONE HYDROCHLORIDE 0.4 MG/ML
.1-.4 INJECTION, SOLUTION INTRAMUSCULAR; INTRAVENOUS; SUBCUTANEOUS
Status: DISCONTINUED | OUTPATIENT
Start: 2019-01-01 | End: 2019-01-01 | Stop reason: HOSPADM

## 2019-01-01 RX ORDER — EZETIMIBE 10 MG/1
10 TABLET ORAL DAILY
COMMUNITY

## 2019-01-01 RX ORDER — HYDROMORPHONE HYDROCHLORIDE 1 MG/ML
.3-.5 INJECTION, SOLUTION INTRAMUSCULAR; INTRAVENOUS; SUBCUTANEOUS EVERY 30 MIN PRN
Status: DISCONTINUED | OUTPATIENT
Start: 2019-01-01 | End: 2019-01-01

## 2019-01-01 RX ORDER — GLYCOPYRROLATE 0.2 MG/ML
.1-.2 INJECTION, SOLUTION INTRAMUSCULAR; INTRAVENOUS EVERY 4 HOURS PRN
Status: DISCONTINUED | OUTPATIENT
Start: 2019-01-01 | End: 2019-01-01 | Stop reason: HOSPADM

## 2019-01-01 RX ORDER — SENNOSIDES 8.6 MG
650 CAPSULE ORAL EVERY 6 HOURS PRN
COMMUNITY

## 2019-01-01 RX ORDER — HYDROMORPHONE HYDROCHLORIDE 1 MG/ML
0.2 INJECTION, SOLUTION INTRAMUSCULAR; INTRAVENOUS; SUBCUTANEOUS ONCE
Status: COMPLETED | OUTPATIENT
Start: 2019-01-01 | End: 2019-01-01

## 2019-01-01 RX ORDER — POTASSIUM CHLORIDE 29.8 MG/ML
20 INJECTION INTRAVENOUS
Status: DISCONTINUED | OUTPATIENT
Start: 2019-01-01 | End: 2019-01-01

## 2019-01-01 RX ORDER — AMOXICILLIN 250 MG
1 CAPSULE ORAL 2 TIMES DAILY PRN
Status: DISCONTINUED | OUTPATIENT
Start: 2019-01-01 | End: 2019-01-01 | Stop reason: HOSPADM

## 2019-01-01 RX ORDER — SERTRALINE HYDROCHLORIDE 100 MG/1
100 TABLET, FILM COATED ORAL DAILY
COMMUNITY

## 2019-01-01 RX ORDER — HYDROMORPHONE HYDROCHLORIDE 1 MG/ML
.3-.5 INJECTION, SOLUTION INTRAMUSCULAR; INTRAVENOUS; SUBCUTANEOUS EVERY 10 MIN PRN
Status: DISCONTINUED | OUTPATIENT
Start: 2019-01-01 | End: 2019-01-01

## 2019-01-01 RX ORDER — ONDANSETRON 4 MG/1
4 TABLET, ORALLY DISINTEGRATING ORAL EVERY 6 HOURS PRN
Status: DISCONTINUED | OUTPATIENT
Start: 2019-01-01 | End: 2019-01-01 | Stop reason: HOSPADM

## 2019-01-01 RX ORDER — FUROSEMIDE 10 MG/ML
40 INJECTION INTRAMUSCULAR; INTRAVENOUS EVERY 12 HOURS
Status: COMPLETED | OUTPATIENT
Start: 2019-01-01 | End: 2019-01-01

## 2019-01-01 RX ORDER — LORAZEPAM 2 MG/ML
.5-1 INJECTION INTRAMUSCULAR
Status: DISCONTINUED | OUTPATIENT
Start: 2019-01-01 | End: 2019-01-01

## 2019-01-01 RX ORDER — ACETAMINOPHEN 650 MG/1
650 SUPPOSITORY RECTAL EVERY 6 HOURS PRN
Status: DISCONTINUED | OUTPATIENT
Start: 2019-01-01 | End: 2019-01-01 | Stop reason: HOSPADM

## 2019-01-01 RX ORDER — ALBUTEROL SULFATE 0.83 MG/ML
2.5 SOLUTION RESPIRATORY (INHALATION)
Status: DISCONTINUED | OUTPATIENT
Start: 2019-01-01 | End: 2019-01-01

## 2019-01-01 RX ORDER — LEVOTHYROXINE SODIUM 75 UG/1
75 TABLET ORAL DAILY
COMMUNITY

## 2019-01-01 RX ORDER — LANOLIN ALCOHOL/MO/W.PET/CERES
1000 CREAM (GRAM) TOPICAL DAILY
COMMUNITY

## 2019-01-01 RX ORDER — PROPOFOL 10 MG/ML
INJECTION, EMULSION INTRAVENOUS CONTINUOUS PRN
Status: DISCONTINUED | OUTPATIENT
Start: 2019-01-01 | End: 2019-01-01

## 2019-01-01 RX ORDER — CEFEPIME HYDROCHLORIDE 1 G/1
1 INJECTION, POWDER, FOR SOLUTION INTRAMUSCULAR; INTRAVENOUS EVERY 24 HOURS
Status: DISCONTINUED | OUTPATIENT
Start: 2019-01-01 | End: 2019-01-01

## 2019-01-01 RX ORDER — POTASSIUM CHLORIDE 1.5 G/1.58G
20-40 POWDER, FOR SOLUTION ORAL
Status: DISCONTINUED | OUTPATIENT
Start: 2019-01-01 | End: 2019-01-01

## 2019-01-01 RX ORDER — LABETALOL 20 MG/4 ML (5 MG/ML) INTRAVENOUS SYRINGE
10 ONCE
Status: COMPLETED | OUTPATIENT
Start: 2019-01-01 | End: 2019-01-01

## 2019-01-01 RX ORDER — ASPIRIN 81 MG/1
81 TABLET ORAL DAILY
COMMUNITY

## 2019-01-01 RX ORDER — PROPOFOL 10 MG/ML
70 INJECTION, EMULSION INTRAVENOUS ONCE
Status: COMPLETED | OUTPATIENT
Start: 2019-01-01 | End: 2019-01-01

## 2019-01-01 RX ORDER — POTASSIUM CHLORIDE 7.45 MG/ML
10 INJECTION INTRAVENOUS
Status: DISCONTINUED | OUTPATIENT
Start: 2019-01-01 | End: 2019-01-01

## 2019-01-01 RX ORDER — TIMOLOL MALEATE 5 MG/ML
1 SOLUTION/ DROPS OPHTHALMIC 2 TIMES DAILY
Status: DISCONTINUED | OUTPATIENT
Start: 2019-01-01 | End: 2019-01-01

## 2019-01-01 RX ORDER — METOPROLOL TARTRATE 1 MG/ML
5 INJECTION, SOLUTION INTRAVENOUS ONCE
Status: COMPLETED | OUTPATIENT
Start: 2019-01-01 | End: 2019-01-01

## 2019-01-01 RX ORDER — AMLODIPINE BESYLATE 10 MG/1
10 TABLET ORAL DAILY
COMMUNITY

## 2019-01-01 RX ORDER — ATROPINE SULFATE 10 MG/ML
1-2 SOLUTION/ DROPS OPHTHALMIC
Status: DISCONTINUED | OUTPATIENT
Start: 2019-01-01 | End: 2019-01-01 | Stop reason: HOSPADM

## 2019-01-01 RX ORDER — LANOLIN ALCOHOL/MO/W.PET/CERES
6 CREAM (GRAM) TOPICAL
COMMUNITY

## 2019-01-01 RX ORDER — FOLIC ACID 1 MG/1
1 TABLET ORAL DAILY
COMMUNITY

## 2019-01-01 RX ORDER — FUROSEMIDE 10 MG/ML
40 INJECTION INTRAMUSCULAR; INTRAVENOUS EVERY 12 HOURS
Status: DISCONTINUED | OUTPATIENT
Start: 2019-01-01 | End: 2019-01-01

## 2019-01-01 RX ORDER — CHLORHEXIDINE GLUCONATE ORAL RINSE 1.2 MG/ML
15 SOLUTION DENTAL EVERY 12 HOURS
Status: DISCONTINUED | OUTPATIENT
Start: 2019-01-01 | End: 2019-01-01 | Stop reason: CLARIF

## 2019-01-01 RX ORDER — IOPAMIDOL 612 MG/ML
100 INJECTION, SOLUTION INTRAVASCULAR ONCE
Status: COMPLETED | OUTPATIENT
Start: 2019-01-01 | End: 2019-01-01

## 2019-01-01 RX ORDER — ROCURONIUM BROMIDE 50 MG/5 ML
100 SYRINGE (ML) INTRAVENOUS ONCE
Status: COMPLETED | OUTPATIENT
Start: 2019-01-01 | End: 2019-01-01

## 2019-01-01 RX ORDER — CEFTRIAXONE 1 G/1
1 INJECTION, POWDER, FOR SOLUTION INTRAMUSCULAR; INTRAVENOUS EVERY 24 HOURS
Status: COMPLETED | OUTPATIENT
Start: 2019-01-01 | End: 2019-01-01

## 2019-01-01 RX ORDER — PROPOFOL 10 MG/ML
10-20 INJECTION, EMULSION INTRAVENOUS EVERY 30 MIN PRN
Status: DISCONTINUED | OUTPATIENT
Start: 2019-01-01 | End: 2019-01-01

## 2019-01-01 RX ORDER — LABETALOL 20 MG/4 ML (5 MG/ML) INTRAVENOUS SYRINGE
10 ONCE
Status: DISCONTINUED | OUTPATIENT
Start: 2019-01-01 | End: 2019-01-01 | Stop reason: HOSPADM

## 2019-01-01 RX ORDER — ONDANSETRON 2 MG/ML
4 INJECTION INTRAMUSCULAR; INTRAVENOUS EVERY 6 HOURS PRN
Status: DISCONTINUED | OUTPATIENT
Start: 2019-01-01 | End: 2019-01-01 | Stop reason: HOSPADM

## 2019-01-01 RX ORDER — HEPARIN SODIUM 5000 [USP'U]/.5ML
5000 INJECTION, SOLUTION INTRAVENOUS; SUBCUTANEOUS EVERY 8 HOURS
Status: DISCONTINUED | OUTPATIENT
Start: 2019-01-01 | End: 2019-01-01

## 2019-01-01 RX ORDER — LIDOCAINE HYDROCHLORIDE 10 MG/ML
INJECTION, SOLUTION INFILTRATION; PERINEURAL
Status: DISCONTINUED
Start: 2019-01-01 | End: 2019-01-01 | Stop reason: HOSPADM

## 2019-01-01 RX ORDER — ONDANSETRON 2 MG/ML
4 INJECTION INTRAMUSCULAR; INTRAVENOUS EVERY 6 HOURS PRN
Status: DISCONTINUED | OUTPATIENT
Start: 2019-01-01 | End: 2019-01-01

## 2019-01-01 RX ORDER — TIMOLOL MALEATE 5 MG/ML
1 SOLUTION/ DROPS OPHTHALMIC 2 TIMES DAILY
COMMUNITY

## 2019-01-01 RX ORDER — LEVOTHYROXINE SODIUM 75 UG/1
75 TABLET ORAL
Status: DISCONTINUED | OUTPATIENT
Start: 2019-01-01 | End: 2019-01-01

## 2019-01-01 RX ORDER — FENTANYL CITRATE 50 UG/ML
INJECTION, SOLUTION INTRAMUSCULAR; INTRAVENOUS PRN
Status: DISCONTINUED | OUTPATIENT
Start: 2019-01-01 | End: 2019-01-01

## 2019-01-01 RX ADMIN — HYDROMORPHONE HYDROCHLORIDE 0.5 MG: 1 INJECTION, SOLUTION INTRAMUSCULAR; INTRAVENOUS; SUBCUTANEOUS at 05:04

## 2019-01-01 RX ADMIN — ACETAMINOPHEN 650 MG: 160 SUSPENSION ORAL at 19:35

## 2019-01-01 RX ADMIN — METOPROLOL TARTRATE 10 MG: 5 INJECTION INTRAVENOUS at 02:52

## 2019-01-01 RX ADMIN — TIMOLOL MALEATE 1 DROP: 5 SOLUTION OPHTHALMIC at 08:02

## 2019-01-01 RX ADMIN — MORPHINE SULFATE 10 MG: 100 SOLUTION ORAL at 08:28

## 2019-01-01 RX ADMIN — MODAFINIL 200 MG: 100 TABLET ORAL at 08:47

## 2019-01-01 RX ADMIN — SODIUM CHLORIDE 3 UNITS/HR: 9 INJECTION, SOLUTION INTRAVENOUS at 05:29

## 2019-01-01 RX ADMIN — PROPOFOL 70 MG: 10 INJECTION, EMULSION INTRAVENOUS at 22:19

## 2019-01-01 RX ADMIN — FUROSEMIDE 40 MG: 10 INJECTION, SOLUTION INTRAVENOUS at 13:29

## 2019-01-01 RX ADMIN — ALTEPLASE 6 MG: KIT at 22:18

## 2019-01-01 RX ADMIN — POTASSIUM CHLORIDE 20 MEQ: 1.5 POWDER, FOR SOLUTION ORAL at 07:51

## 2019-01-01 RX ADMIN — CHLORHEXIDINE GLUCONATE 15 ML: 1.2 RINSE ORAL at 08:41

## 2019-01-01 RX ADMIN — SODIUM CHLORIDE 4 UNITS/HR: 9 INJECTION, SOLUTION INTRAVENOUS at 13:44

## 2019-01-01 RX ADMIN — PHENYLEPHRINE HYDROCHLORIDE 200 MCG: 10 INJECTION INTRAVENOUS at 01:17

## 2019-01-01 RX ADMIN — IOPAMIDOL 50 ML: 612 INJECTION, SOLUTION INTRAVENOUS at 01:44

## 2019-01-01 RX ADMIN — TIMOLOL MALEATE 1 DROP: 5 SOLUTION OPHTHALMIC at 10:22

## 2019-01-01 RX ADMIN — Medication 75 MG: at 07:51

## 2019-01-01 RX ADMIN — PHENYLEPHRINE HYDROCHLORIDE 200 MCG: 10 INJECTION INTRAVENOUS at 01:30

## 2019-01-01 RX ADMIN — ALTEPLASE 57 MG: KIT at 22:24

## 2019-01-01 RX ADMIN — TIMOLOL MALEATE 1 DROP: 5 SOLUTION OPHTHALMIC at 08:47

## 2019-01-01 RX ADMIN — Medication 100 MG: at 22:20

## 2019-01-01 RX ADMIN — ATROPINE SULFATE 2 DROP: 10 SOLUTION/ DROPS OPHTHALMIC at 08:31

## 2019-01-01 RX ADMIN — HUMAN ALBUMIN MICROSPHERES AND PERFLUTREN 5 ML: 10; .22 INJECTION, SOLUTION INTRAVENOUS at 08:30

## 2019-01-01 RX ADMIN — LEVOTHYROXINE SODIUM 75 MCG: 75 TABLET ORAL at 08:59

## 2019-01-01 RX ADMIN — LEVOTHYROXINE SODIUM 75 MCG: 75 TABLET ORAL at 10:36

## 2019-01-01 RX ADMIN — LEVOTHYROXINE SODIUM 75 MCG: 75 TABLET ORAL at 06:30

## 2019-01-01 RX ADMIN — EZETIMIBE 10 MG: 10 TABLET ORAL at 08:01

## 2019-01-01 RX ADMIN — Medication 75 MG: at 09:01

## 2019-01-01 RX ADMIN — SODIUM CHLORIDE 7.5 MG/HR: 900 INJECTION, SOLUTION INTRAVENOUS at 19:03

## 2019-01-01 RX ADMIN — Medication 10 MEQ: at 08:52

## 2019-01-01 RX ADMIN — Medication 50 MG: at 20:28

## 2019-01-01 RX ADMIN — Medication 40 MG: at 05:46

## 2019-01-01 RX ADMIN — HYDROMORPHONE HYDROCHLORIDE 0.5 MG: 1 INJECTION, SOLUTION INTRAMUSCULAR; INTRAVENOUS; SUBCUTANEOUS at 00:20

## 2019-01-01 RX ADMIN — MODAFINIL 200 MG: 100 TABLET ORAL at 14:15

## 2019-01-01 RX ADMIN — EZETIMIBE 10 MG: 10 TABLET ORAL at 08:47

## 2019-01-01 RX ADMIN — CHLORHEXIDINE GLUCONATE 15 ML: 1.2 RINSE ORAL at 20:10

## 2019-01-01 RX ADMIN — MAGNESIUM SULFATE HEPTAHYDRATE 2 G: 40 INJECTION, SOLUTION INTRAVENOUS at 06:15

## 2019-01-01 RX ADMIN — PIPERACILLIN SODIUM,TAZOBACTAM SODIUM 3.38 G: 3; .375 INJECTION, POWDER, FOR SOLUTION INTRAVENOUS at 14:15

## 2019-01-01 RX ADMIN — Medication 75 MG: at 22:13

## 2019-01-01 RX ADMIN — HYDROMORPHONE HYDROCHLORIDE 0.5 MG: 1 INJECTION, SOLUTION INTRAMUSCULAR; INTRAVENOUS; SUBCUTANEOUS at 00:14

## 2019-01-01 RX ADMIN — HEPARIN SODIUM 5000 UNITS: 5000 INJECTION, SOLUTION INTRAVENOUS; SUBCUTANEOUS at 22:03

## 2019-01-01 RX ADMIN — PANTOPRAZOLE SODIUM 40 MG: 40 INJECTION, POWDER, FOR SOLUTION INTRAVENOUS at 08:49

## 2019-01-01 RX ADMIN — SODIUM CHLORIDE 10 UNITS/HR: 9 INJECTION, SOLUTION INTRAVENOUS at 23:43

## 2019-01-01 RX ADMIN — EZETIMIBE 10 MG: 10 TABLET ORAL at 08:18

## 2019-01-01 RX ADMIN — HEPARIN SODIUM 5000 UNITS: 5000 INJECTION, SOLUTION INTRAVENOUS; SUBCUTANEOUS at 05:47

## 2019-01-01 RX ADMIN — SODIUM CHLORIDE: 9 INJECTION, SOLUTION INTRAVENOUS at 03:28

## 2019-01-01 RX ADMIN — LORAZEPAM 1 MG: 2 INJECTION INTRAMUSCULAR; INTRAVENOUS at 10:23

## 2019-01-01 RX ADMIN — FOLIC ACID 1 MG: 1 TABLET ORAL at 10:35

## 2019-01-01 RX ADMIN — HYDROMORPHONE HYDROCHLORIDE 0.5 MG: 1 INJECTION, SOLUTION INTRAMUSCULAR; INTRAVENOUS; SUBCUTANEOUS at 11:05

## 2019-01-01 RX ADMIN — Medication 30 MG: at 01:00

## 2019-01-01 RX ADMIN — HEPARIN SODIUM 5000 UNITS: 5000 INJECTION, SOLUTION INTRAVENOUS; SUBCUTANEOUS at 13:29

## 2019-01-01 RX ADMIN — HEPARIN SODIUM 5000 UNITS: 5000 INJECTION, SOLUTION INTRAVENOUS; SUBCUTANEOUS at 17:00

## 2019-01-01 RX ADMIN — CEFEPIME HYDROCHLORIDE 1 G: 1 INJECTION, POWDER, FOR SOLUTION INTRAMUSCULAR; INTRAVENOUS at 12:56

## 2019-01-01 RX ADMIN — CHLORHEXIDINE GLUCONATE 15 ML: 1.2 RINSE ORAL at 08:00

## 2019-01-01 RX ADMIN — SODIUM CHLORIDE 10 MG/HR: 900 INJECTION, SOLUTION INTRAVENOUS at 03:15

## 2019-01-01 RX ADMIN — ACETAMINOPHEN 650 MG: 160 SUSPENSION ORAL at 02:22

## 2019-01-01 RX ADMIN — POTASSIUM CHLORIDE 20 MEQ: 1.5 POWDER, FOR SOLUTION ORAL at 16:08

## 2019-01-01 RX ADMIN — MORPHINE SULFATE 10 MG: 100 SOLUTION ORAL at 20:24

## 2019-01-01 RX ADMIN — HYDROMORPHONE HYDROCHLORIDE 0.5 MG: 1 INJECTION, SOLUTION INTRAMUSCULAR; INTRAVENOUS; SUBCUTANEOUS at 02:34

## 2019-01-01 RX ADMIN — HEPARIN SODIUM 5000 UNITS: 5000 INJECTION, SOLUTION INTRAVENOUS; SUBCUTANEOUS at 14:12

## 2019-01-01 RX ADMIN — MICONAZOLE NITRATE: 20 CREAM TOPICAL at 08:47

## 2019-01-01 RX ADMIN — MORPHINE SULFATE 10 MG: 100 SOLUTION ORAL at 00:26

## 2019-01-01 RX ADMIN — CHLORHEXIDINE GLUCONATE 15 ML: 1.2 RINSE ORAL at 20:06

## 2019-01-01 RX ADMIN — SODIUM CHLORIDE 6 UNITS/HR: 9 INJECTION, SOLUTION INTRAVENOUS at 08:07

## 2019-01-01 RX ADMIN — POTASSIUM PHOSPHATE, MONOBASIC AND POTASSIUM PHOSPHATE, DIBASIC 15 MMOL: 224; 236 INJECTION, SOLUTION INTRAVENOUS at 06:45

## 2019-01-01 RX ADMIN — MORPHINE SULFATE 10 MG: 100 SOLUTION ORAL at 17:06

## 2019-01-01 RX ADMIN — MORPHINE SULFATE 10 MG: 100 SOLUTION ORAL at 06:15

## 2019-01-01 RX ADMIN — HEPARIN SODIUM 10000 UNITS: 10000 INJECTION INTRAVENOUS; SUBCUTANEOUS at 01:37

## 2019-01-01 RX ADMIN — MODAFINIL 200 MG: 100 TABLET ORAL at 07:50

## 2019-01-01 RX ADMIN — ATROPINE SULFATE 2 DROP: 10 SOLUTION/ DROPS OPHTHALMIC at 10:23

## 2019-01-01 RX ADMIN — HYDROMORPHONE HYDROCHLORIDE 0.5 MG: 1 INJECTION, SOLUTION INTRAMUSCULAR; INTRAVENOUS; SUBCUTANEOUS at 19:33

## 2019-01-01 RX ADMIN — METOPROLOL TARTRATE 5 MG: 5 INJECTION INTRAVENOUS at 20:18

## 2019-01-01 RX ADMIN — LEVOTHYROXINE SODIUM 75 MCG: 75 TABLET ORAL at 06:39

## 2019-01-01 RX ADMIN — METOPROLOL TARTRATE 5 MG: 5 INJECTION INTRAVENOUS at 19:04

## 2019-01-01 RX ADMIN — HYDROMORPHONE HYDROCHLORIDE 0.5 MG: 1 INJECTION, SOLUTION INTRAMUSCULAR; INTRAVENOUS; SUBCUTANEOUS at 03:44

## 2019-01-01 RX ADMIN — CHLORHEXIDINE GLUCONATE 15 ML: 1.2 RINSE ORAL at 20:05

## 2019-01-01 RX ADMIN — SODIUM CHLORIDE 2.5 MG/HR: 900 INJECTION, SOLUTION INTRAVENOUS at 13:36

## 2019-01-01 RX ADMIN — IOPAMIDOL 50 ML: 755 INJECTION, SOLUTION INTRAVENOUS at 00:43

## 2019-01-01 RX ADMIN — POTASSIUM CHLORIDE 20 MEQ: 1.5 POWDER, FOR SOLUTION ORAL at 10:34

## 2019-01-01 RX ADMIN — MORPHINE SULFATE 5 MG: 100 SOLUTION ORAL at 14:08

## 2019-01-01 RX ADMIN — MORPHINE SULFATE 10 MG: 100 SOLUTION ORAL at 00:10

## 2019-01-01 RX ADMIN — LABETALOL HYDROCHLORIDE 10 MG: 5 INJECTION INTRAVENOUS at 10:38

## 2019-01-01 RX ADMIN — Medication 40 MG: at 07:51

## 2019-01-01 RX ADMIN — MICONAZOLE NITRATE: 20 CREAM TOPICAL at 13:43

## 2019-01-01 RX ADMIN — PROPOFOL 40 MCG/KG/MIN: 10 INJECTION, EMULSION INTRAVENOUS at 03:27

## 2019-01-01 RX ADMIN — Medication 10 MEQ: at 07:42

## 2019-01-01 RX ADMIN — CHLORHEXIDINE GLUCONATE 15 ML: 1.2 RINSE ORAL at 07:56

## 2019-01-01 RX ADMIN — ASPIRIN 81 MG: 81 TABLET, COATED ORAL at 08:46

## 2019-01-01 RX ADMIN — MORPHINE SULFATE 10 MG: 100 SOLUTION ORAL at 10:24

## 2019-01-01 RX ADMIN — CHLORHEXIDINE GLUCONATE 15 ML: 1.2 RINSE ORAL at 19:20

## 2019-01-01 RX ADMIN — LORAZEPAM 1 MG: 2 INJECTION INTRAMUSCULAR; INTRAVENOUS at 12:00

## 2019-01-01 RX ADMIN — HYDROMORPHONE HYDROCHLORIDE 0.5 MG: 1 INJECTION, SOLUTION INTRAMUSCULAR; INTRAVENOUS; SUBCUTANEOUS at 01:55

## 2019-01-01 RX ADMIN — INSULIN ASPART 1 UNITS: 100 INJECTION, SOLUTION INTRAVENOUS; SUBCUTANEOUS at 16:13

## 2019-01-01 RX ADMIN — HYDRALAZINE HYDROCHLORIDE 10 MG: 20 INJECTION INTRAMUSCULAR; INTRAVENOUS at 08:47

## 2019-01-01 RX ADMIN — HYDROMORPHONE HYDROCHLORIDE 0.5 MG: 1 INJECTION, SOLUTION INTRAMUSCULAR; INTRAVENOUS; SUBCUTANEOUS at 21:18

## 2019-01-01 RX ADMIN — ATROPINE SULFATE 2 DROP: 10 SOLUTION/ DROPS OPHTHALMIC at 19:40

## 2019-01-01 RX ADMIN — PROPOFOL 50 MCG/KG/MIN: 10 INJECTION, EMULSION INTRAVENOUS at 02:08

## 2019-01-01 RX ADMIN — POTASSIUM PHOSPHATE, MONOBASIC AND POTASSIUM PHOSPHATE, DIBASIC 20 MMOL: 224; 236 INJECTION, SOLUTION INTRAVENOUS at 12:13

## 2019-01-01 RX ADMIN — ACETAMINOPHEN 650 MG: 160 SUSPENSION ORAL at 08:47

## 2019-01-01 RX ADMIN — LORAZEPAM 1 MG: 2 INJECTION INTRAMUSCULAR; INTRAVENOUS at 21:48

## 2019-01-01 RX ADMIN — HYDROMORPHONE HYDROCHLORIDE 0.2 MG: 1 INJECTION, SOLUTION INTRAMUSCULAR; INTRAVENOUS; SUBCUTANEOUS at 16:42

## 2019-01-01 RX ADMIN — FUROSEMIDE 40 MG: 10 INJECTION, SOLUTION INTRAVENOUS at 08:47

## 2019-01-01 RX ADMIN — EZETIMIBE 10 MG: 10 TABLET ORAL at 09:00

## 2019-01-01 RX ADMIN — SODIUM CHLORIDE 2.5 UNITS/HR: 9 INJECTION, SOLUTION INTRAVENOUS at 14:05

## 2019-01-01 RX ADMIN — FUROSEMIDE 40 MG: 10 INJECTION, SOLUTION INTRAVENOUS at 19:39

## 2019-01-01 RX ADMIN — SODIUM CHLORIDE 1 UNITS/HR: 9 INJECTION, SOLUTION INTRAVENOUS at 04:00

## 2019-01-01 RX ADMIN — EZETIMIBE 10 MG: 10 TABLET ORAL at 10:35

## 2019-01-01 RX ADMIN — LORAZEPAM 1 MG: 2 INJECTION INTRAMUSCULAR; INTRAVENOUS at 07:34

## 2019-01-01 RX ADMIN — Medication 50 MG: at 08:18

## 2019-01-01 RX ADMIN — SODIUM CHLORIDE, PRESERVATIVE FREE: 5 INJECTION INTRAVENOUS at 23:43

## 2019-01-01 RX ADMIN — Medication 75 MG: at 08:01

## 2019-01-01 RX ADMIN — ASPIRIN 81 MG: 81 TABLET, COATED ORAL at 08:01

## 2019-01-01 RX ADMIN — CEFEPIME HYDROCHLORIDE 1 G: 1 INJECTION, POWDER, FOR SOLUTION INTRAMUSCULAR; INTRAVENOUS at 11:28

## 2019-01-01 RX ADMIN — LORAZEPAM 1 MG: 2 INJECTION INTRAMUSCULAR; INTRAVENOUS at 08:06

## 2019-01-01 RX ADMIN — METOPROLOL TARTRATE 5 MG: 1 INJECTION, SOLUTION INTRAVENOUS at 22:28

## 2019-01-01 RX ADMIN — SODIUM CHLORIDE 4 UNITS/HR: 9 INJECTION, SOLUTION INTRAVENOUS at 14:12

## 2019-01-01 RX ADMIN — SODIUM CHLORIDE 14 UNITS/HR: 9 INJECTION, SOLUTION INTRAVENOUS at 02:14

## 2019-01-01 RX ADMIN — ALBUTEROL SULFATE 2.5 MG: 2.5 SOLUTION RESPIRATORY (INHALATION) at 08:36

## 2019-01-01 RX ADMIN — CHLORHEXIDINE GLUCONATE 15 ML: 1.2 RINSE ORAL at 07:55

## 2019-01-01 RX ADMIN — LORAZEPAM 1 MG: 2 INJECTION INTRAMUSCULAR; INTRAVENOUS at 15:03

## 2019-01-01 RX ADMIN — PIPERACILLIN SODIUM,TAZOBACTAM SODIUM 3.38 G: 3; .375 INJECTION, POWDER, FOR SOLUTION INTRAVENOUS at 21:03

## 2019-01-01 RX ADMIN — TIMOLOL MALEATE 1 DROP: 5 SOLUTION OPHTHALMIC at 08:45

## 2019-01-01 RX ADMIN — PHENYLEPHRINE HYDROCHLORIDE 100 MCG: 10 INJECTION INTRAVENOUS at 01:14

## 2019-01-01 RX ADMIN — SUGAMMADEX 280 MG: 100 INJECTION, SOLUTION INTRAVENOUS at 02:09

## 2019-01-01 RX ADMIN — SODIUM CHLORIDE, PRESERVATIVE FREE: 5 INJECTION INTRAVENOUS at 13:26

## 2019-01-01 RX ADMIN — TIMOLOL MALEATE 1 DROP: 5 SOLUTION OPHTHALMIC at 20:10

## 2019-01-01 RX ADMIN — LABETALOL HYDROCHLORIDE 10 MG: 5 INJECTION INTRAVENOUS at 08:20

## 2019-01-01 RX ADMIN — LORAZEPAM 1 MG: 2 INJECTION INTRAMUSCULAR; INTRAVENOUS at 13:43

## 2019-01-01 RX ADMIN — Medication 75 MG: at 20:03

## 2019-01-01 RX ADMIN — CEFTRIAXONE SODIUM 1 G: 1 INJECTION, POWDER, FOR SOLUTION INTRAMUSCULAR; INTRAVENOUS at 03:04

## 2019-01-01 RX ADMIN — POTASSIUM CHLORIDE 20 MEQ: 1.5 POWDER, FOR SOLUTION ORAL at 19:20

## 2019-01-01 RX ADMIN — INSULIN ASPART 2 UNITS: 100 INJECTION, SOLUTION INTRAVENOUS; SUBCUTANEOUS at 08:46

## 2019-01-01 RX ADMIN — PIPERACILLIN SODIUM,TAZOBACTAM SODIUM 3.38 G: 3; .375 INJECTION, POWDER, FOR SOLUTION INTRAVENOUS at 08:01

## 2019-01-01 RX ADMIN — TIMOLOL MALEATE 1 DROP: 5 SOLUTION OPHTHALMIC at 21:09

## 2019-01-01 RX ADMIN — MORPHINE SULFATE 10 MG: 100 SOLUTION ORAL at 10:26

## 2019-01-01 RX ADMIN — FOLIC ACID 1 MG: 1 TABLET ORAL at 08:01

## 2019-01-01 RX ADMIN — SODIUM CHLORIDE 10 MG/HR: 900 INJECTION, SOLUTION INTRAVENOUS at 23:06

## 2019-01-01 RX ADMIN — ACETAMINOPHEN 650 MG: 160 SUSPENSION ORAL at 09:23

## 2019-01-01 RX ADMIN — SODIUM CHLORIDE 100 ML: 9 INJECTION, SOLUTION INTRAVENOUS at 00:43

## 2019-01-01 RX ADMIN — ACETAMINOPHEN 650 MG: 160 SUSPENSION ORAL at 20:10

## 2019-01-01 RX ADMIN — MORPHINE SULFATE 10 MG: 100 SOLUTION ORAL at 02:38

## 2019-01-01 RX ADMIN — PANTOPRAZOLE SODIUM 40 MG: 40 INJECTION, POWDER, FOR SOLUTION INTRAVENOUS at 08:18

## 2019-01-01 RX ADMIN — VANCOMYCIN HYDROCHLORIDE 1500 MG: 5 INJECTION, POWDER, LYOPHILIZED, FOR SOLUTION INTRAVENOUS at 22:09

## 2019-01-01 RX ADMIN — MICONAZOLE NITRATE: 20 CREAM TOPICAL at 17:47

## 2019-01-01 RX ADMIN — ACETAMINOPHEN 650 MG: 160 SUSPENSION ORAL at 14:53

## 2019-01-01 RX ADMIN — SODIUM CHLORIDE 10 UNITS/HR: 9 INJECTION, SOLUTION INTRAVENOUS at 22:22

## 2019-01-01 RX ADMIN — CHLORHEXIDINE GLUCONATE 15 ML: 1.2 RINSE ORAL at 08:59

## 2019-01-01 RX ADMIN — INSULIN ASPART 2 UNITS: 100 INJECTION, SOLUTION INTRAVENOUS; SUBCUTANEOUS at 03:45

## 2019-01-01 RX ADMIN — IOPAMIDOL 70 ML: 755 INJECTION, SOLUTION INTRAVENOUS at 21:40

## 2019-01-01 RX ADMIN — PIPERACILLIN SODIUM,TAZOBACTAM SODIUM 3.38 G: 3; .375 INJECTION, POWDER, FOR SOLUTION INTRAVENOUS at 08:59

## 2019-01-01 RX ADMIN — SODIUM CHLORIDE 3 UNITS/HR: 9 INJECTION, SOLUTION INTRAVENOUS at 15:49

## 2019-01-01 RX ADMIN — FOLIC ACID 1 MG: 1 TABLET ORAL at 07:51

## 2019-01-01 RX ADMIN — POTASSIUM CHLORIDE 20 MEQ: 1.5 POWDER, FOR SOLUTION ORAL at 05:38

## 2019-01-01 RX ADMIN — Medication 40 MG: at 08:59

## 2019-01-01 RX ADMIN — EZETIMIBE 10 MG: 10 TABLET ORAL at 07:51

## 2019-01-01 RX ADMIN — ASPIRIN 81 MG: 81 TABLET, COATED ORAL at 07:51

## 2019-01-01 RX ADMIN — TIMOLOL MALEATE 1 DROP: 5 SOLUTION OPHTHALMIC at 08:54

## 2019-01-01 RX ADMIN — PROPOFOL 50 MG: 10 INJECTION, EMULSION INTRAVENOUS at 02:11

## 2019-01-01 RX ADMIN — POTASSIUM CHLORIDE 40 MEQ: 1.5 POWDER, FOR SOLUTION ORAL at 07:18

## 2019-01-01 RX ADMIN — MODAFINIL 200 MG: 100 TABLET ORAL at 08:01

## 2019-01-01 RX ADMIN — SODIUM CHLORIDE 2 UNITS/HR: 9 INJECTION, SOLUTION INTRAVENOUS at 01:01

## 2019-01-01 RX ADMIN — FOLIC ACID 1 MG: 1 TABLET ORAL at 08:18

## 2019-01-01 RX ADMIN — Medication 40 MG: at 06:39

## 2019-01-01 RX ADMIN — Medication 75 MG: at 21:18

## 2019-01-01 RX ADMIN — HYDROMORPHONE HYDROCHLORIDE 0.3 MG: 1 INJECTION, SOLUTION INTRAMUSCULAR; INTRAVENOUS; SUBCUTANEOUS at 17:16

## 2019-01-01 RX ADMIN — PROPOFOL 35 MCG/KG/MIN: 10 INJECTION, EMULSION INTRAVENOUS at 05:50

## 2019-01-01 RX ADMIN — PHENYLEPHRINE HYDROCHLORIDE 0.25 MCG/KG/MIN: 10 INJECTION INTRAVENOUS at 01:20

## 2019-01-01 RX ADMIN — SODIUM CHLORIDE, POTASSIUM CHLORIDE, SODIUM LACTATE AND CALCIUM CHLORIDE: 600; 310; 30; 20 INJECTION, SOLUTION INTRAVENOUS at 01:48

## 2019-01-01 RX ADMIN — FOLIC ACID 1 MG: 1 TABLET ORAL at 09:00

## 2019-01-01 RX ADMIN — INSULIN ASPART 2 UNITS: 100 INJECTION, SOLUTION INTRAVENOUS; SUBCUTANEOUS at 20:04

## 2019-01-01 RX ADMIN — LABETALOL 20 MG/4 ML (5 MG/ML) INTRAVENOUS SYRINGE 10 MG: at 22:01

## 2019-01-01 RX ADMIN — ASPIRIN 81 MG: 81 TABLET, COATED ORAL at 10:41

## 2019-01-01 RX ADMIN — HEPARIN SODIUM 5000 UNITS: 5000 INJECTION, SOLUTION INTRAVENOUS; SUBCUTANEOUS at 22:25

## 2019-01-01 RX ADMIN — TIMOLOL MALEATE 1 DROP: 5 SOLUTION OPHTHALMIC at 09:01

## 2019-01-01 RX ADMIN — LEVOTHYROXINE SODIUM 75 MCG: 75 TABLET ORAL at 08:18

## 2019-01-01 RX ADMIN — GLYCOPYRROLATE 0.2 MG: 0.2 INJECTION, SOLUTION INTRAMUSCULAR; INTRAVENOUS at 16:35

## 2019-01-01 RX ADMIN — Medication 25 MG: at 10:36

## 2019-01-01 RX ADMIN — CHLORHEXIDINE GLUCONATE 15 ML: 1.2 RINSE ORAL at 08:48

## 2019-01-01 RX ADMIN — CEFEPIME HYDROCHLORIDE 1 G: 1 INJECTION, POWDER, FOR SOLUTION INTRAMUSCULAR; INTRAVENOUS at 12:02

## 2019-01-01 RX ADMIN — Medication 75 MG: at 21:03

## 2019-01-01 RX ADMIN — MORPHINE SULFATE 10 MG: 100 SOLUTION ORAL at 06:36

## 2019-01-01 RX ADMIN — SODIUM CHLORIDE 100 ML: 9 INJECTION, SOLUTION INTRAVENOUS at 21:41

## 2019-01-01 RX ADMIN — MORPHINE SULFATE 10 MG: 100 SOLUTION ORAL at 17:50

## 2019-01-01 RX ADMIN — SODIUM CHLORIDE 10 MG/HR: 900 INJECTION, SOLUTION INTRAVENOUS at 06:50

## 2019-01-01 RX ADMIN — HYDROMORPHONE HYDROCHLORIDE 0.5 MG: 1 INJECTION, SOLUTION INTRAMUSCULAR; INTRAVENOUS; SUBCUTANEOUS at 06:08

## 2019-01-01 RX ADMIN — CHLORHEXIDINE GLUCONATE 15 ML: 1.2 RINSE ORAL at 19:35

## 2019-01-01 RX ADMIN — VANCOMYCIN HYDROCHLORIDE 1500 MG: 5 INJECTION, POWDER, LYOPHILIZED, FOR SOLUTION INTRAVENOUS at 10:35

## 2019-01-01 RX ADMIN — CEFTRIAXONE SODIUM 1 G: 1 INJECTION, POWDER, FOR SOLUTION INTRAMUSCULAR; INTRAVENOUS at 04:45

## 2019-01-01 RX ADMIN — INSULIN ASPART 2 UNITS: 100 INJECTION, SOLUTION INTRAVENOUS; SUBCUTANEOUS at 12:12

## 2019-01-01 RX ADMIN — TIMOLOL MALEATE 1 DROP: 5 SOLUTION OPHTHALMIC at 21:18

## 2019-01-01 RX ADMIN — Medication 40 MG: at 06:31

## 2019-01-01 RX ADMIN — SODIUM CHLORIDE 3 UNITS/HR: 9 INJECTION, SOLUTION INTRAVENOUS at 18:11

## 2019-01-01 RX ADMIN — TIMOLOL MALEATE 1 DROP: 5 SOLUTION OPHTHALMIC at 20:06

## 2019-01-01 RX ADMIN — HYDROMORPHONE HYDROCHLORIDE 0.5 MG: 1 INJECTION, SOLUTION INTRAMUSCULAR; INTRAVENOUS; SUBCUTANEOUS at 20:30

## 2019-01-01 RX ADMIN — HYDROMORPHONE HYDROCHLORIDE 0.5 MG: 1 INJECTION, SOLUTION INTRAMUSCULAR; INTRAVENOUS; SUBCUTANEOUS at 03:55

## 2019-01-01 RX ADMIN — LABETALOL HYDROCHLORIDE 10 MG: 5 INJECTION INTRAVENOUS at 13:08

## 2019-01-01 RX ADMIN — HYDROMORPHONE HYDROCHLORIDE 0.5 MG: 1 INJECTION, SOLUTION INTRAMUSCULAR; INTRAVENOUS; SUBCUTANEOUS at 06:33

## 2019-01-01 RX ADMIN — PROPOFOL 10 MCG/KG/MIN: 10 INJECTION, EMULSION INTRAVENOUS at 22:28

## 2019-01-01 RX ADMIN — MAGNESIUM SULFATE HEPTAHYDRATE 2 G: 40 INJECTION, SOLUTION INTRAVENOUS at 05:37

## 2019-01-01 RX ADMIN — HEPARIN SODIUM 5000 UNITS: 5000 INJECTION, SOLUTION INTRAVENOUS; SUBCUTANEOUS at 06:39

## 2019-01-01 RX ADMIN — TIMOLOL MALEATE 1 DROP: 5 SOLUTION OPHTHALMIC at 22:04

## 2019-01-01 RX ADMIN — CEFTRIAXONE SODIUM 1 G: 1 INJECTION, POWDER, FOR SOLUTION INTRAMUSCULAR; INTRAVENOUS at 03:45

## 2019-01-01 RX ADMIN — SODIUM CHLORIDE, POTASSIUM CHLORIDE, SODIUM LACTATE AND CALCIUM CHLORIDE: 600; 310; 30; 20 INJECTION, SOLUTION INTRAVENOUS at 00:49

## 2019-01-01 RX ADMIN — PIPERACILLIN SODIUM,TAZOBACTAM SODIUM 3.38 G: 3; .375 INJECTION, POWDER, FOR SOLUTION INTRAVENOUS at 02:48

## 2019-01-01 RX ADMIN — LEVOTHYROXINE SODIUM 75 MCG: 75 TABLET ORAL at 05:47

## 2019-01-01 RX ADMIN — VANCOMYCIN HYDROCHLORIDE 1500 MG: 5 INJECTION, POWDER, LYOPHILIZED, FOR SOLUTION INTRAVENOUS at 10:11

## 2019-01-01 RX ADMIN — FENTANYL CITRATE 25 MCG: 50 INJECTION, SOLUTION INTRAMUSCULAR; INTRAVENOUS at 01:06

## 2019-01-01 RX ADMIN — LORAZEPAM 1 MG: 2 INJECTION INTRAMUSCULAR; INTRAVENOUS at 20:43

## 2019-01-01 RX ADMIN — HYDROMORPHONE HYDROCHLORIDE 0.5 MG: 1 INJECTION, SOLUTION INTRAMUSCULAR; INTRAVENOUS; SUBCUTANEOUS at 11:56

## 2019-01-01 RX ADMIN — METOPROLOL TARTRATE 5 MG: 5 INJECTION, SOLUTION INTRAVENOUS at 22:28

## 2019-01-01 RX ADMIN — Medication 75 MG: at 08:46

## 2019-01-01 RX ADMIN — LORAZEPAM 1 MG: 2 INJECTION INTRAMUSCULAR; INTRAVENOUS at 03:41

## 2019-01-01 RX ADMIN — FOLIC ACID 1 MG: 1 TABLET ORAL at 08:47

## 2019-01-01 RX ADMIN — METOPROLOL TARTRATE 5 MG: 1 INJECTION, SOLUTION INTRAVENOUS at 20:18

## 2019-01-01 ASSESSMENT — VISUAL ACUITY
OU: NOT TESTABLE

## 2019-01-01 ASSESSMENT — ACTIVITIES OF DAILY LIVING (ADL)
ADLS_ACUITY_SCORE: 23
ADLS_ACUITY_SCORE: 18
ADLS_ACUITY_SCORE: 19
ADLS_ACUITY_SCORE: 19
ADLS_ACUITY_SCORE: 23
ADLS_ACUITY_SCORE: 19
ADLS_ACUITY_SCORE: 23
ADLS_ACUITY_SCORE: 19
ADLS_ACUITY_SCORE: 23
ADLS_ACUITY_SCORE: 17
ADLS_ACUITY_SCORE: 23
ADLS_ACUITY_SCORE: 20
ADLS_ACUITY_SCORE: 23
ADLS_ACUITY_SCORE: 20
ADLS_ACUITY_SCORE: 23
ADLS_ACUITY_SCORE: 17
ADLS_ACUITY_SCORE: 23
ADLS_ACUITY_SCORE: 18
ADLS_ACUITY_SCORE: 18
ADLS_ACUITY_SCORE: 23
ADLS_ACUITY_SCORE: 21
ADLS_ACUITY_SCORE: 23
ADLS_ACUITY_SCORE: 18
ADLS_ACUITY_SCORE: 21
ADLS_ACUITY_SCORE: 23
ADLS_ACUITY_SCORE: 18
ADLS_ACUITY_SCORE: 23
ADLS_ACUITY_SCORE: 20
ADLS_ACUITY_SCORE: 23

## 2019-01-01 ASSESSMENT — MIFFLIN-ST. JEOR
SCORE: 1204.25
SCORE: 1210.25

## 2019-01-01 ASSESSMENT — ENCOUNTER SYMPTOMS: DYSRHYTHMIAS: 1

## 2019-08-04 PROBLEM — I63.9 ACUTE CVA (CEREBROVASCULAR ACCIDENT) (H): Status: ACTIVE | Noted: 2019-01-01

## 2019-08-04 NOTE — H&P
Critical Care  Note      08/04/2019    Name: Nessa Chan MRN#: [id number]   Age: 84 year old YOB: 1935     Hospitals in [place] Day# 0  ICU DAY #0    MV DAY #0             Problem List:   Acute ischemic stroke         Summary/Hospital Course:     Pt intubated/sedated on arrival.  Unable to obtain histories directly.  History obtained from chart and report from ED physician.    84F presented to OSH after being found down and then becoming encephalopathic at her TCU.  Noted to have a leftward gaze and unresponsive.  CT scan at OSH revealed a R insular infarct, followup CTA revealed a left MCA M1 segment high grade stenosis as well as high grade stenosis at the L bifurcation.  Intubated there for airway protection, then tpa given and transferred here for intervention.      Assessment and plan :     Nessa Chan IS a 84 year old female admitted on 8/4/2019 for acute cva.   I have personally reviewed the daily labs, imaging studies, cultures and discussed the case with referring physician and consulting physicians.     My assessment and plan by system for this patient is as follows:    Neurology/Psychiatry:   1. Acute cva:  Appreciate neuro and IR efforts.  S/p aspiration with penumbra device.  Neuro checks and followup imaging per neuro direction.  ASA and plavix per neuro direction.  2.  Sedation:  Propofol, prn midazolam  3.  Analgesia:  Prn dilaudid    Cardiovascular:   1. HLD:  Home zetia   2.  HTN:  Hold home antihypertensives for now  3.  Paroxysmal AF: Rate control as needed with metoprolol, starting 25 bid which is 1/3 of home dose.  Hold AC for now in setting of large acute cva and tpa.    Pulmonary/Ventilator Management:   1. Loss of airway protective reflexes:  In setting of cva noted above.  Intubated/ventilated.    GI and Nutrition :   1. Npo for now.  2.  PPI for pud prophy    Renal/Fluids/Electrolytes:   1. Creat 1.06 appears to be baseline when compared with recents in care everywhere.  2.  Mild  metabolic alkalosis 7.46/39    Infectious Disease:   1. Appears to have UTI on UA.  Ceftriaxone for now pending cultures.    Endocrine:   1. Hypothyroid:  Continue home levothyroxine  2.  Glucose control as needed.    Hematology/Oncology:   1. Wbc ok 10.1  2.  Hbg ok 11.9  3.  plttls ok 319  4.  inr ok 1.09    ICU Prophylaxis:   1. DVT: hold post tpa  2. VAP: HOB 30 degrees, chlorhexidine rinse  3. Stress Ulcer: PPI  4. Restraints: Nonviolent soft two point restraints required and necessary for patient safety and continued cares and good effect as patient continues to pull at necessary lines, tubes despite education and distraction. Will readdress daily.   5. Wound care  -   6. Feeding - npo for now  7. Family Update: per neuro  8. Disposition - icu           Medical History:     PMH ([facility] dischg summary 7/25):  Temporal arteritis  Carotid stenosis bilateral  Depression  Diabetes  Prior GIB while on warfarin  Glaucoma  HLD  HTN  hypoNa  Hypothyroid  Paroxysmal AF  Osteoporosis  ckd    PSH ([facility] 7/25):  Temporal artery biopsy    Home Meds (per [facility] discharge summary 7/25):    Tylenol  Amlodipine 10 daily  zetia 10 daily  Fenofibrate 160 daily  Folate 1 mg daily  Levothyroxine 75 daily  Metoprolol 75 bid  prilosec 20 daily  zoloft 100 daily  Timolol optic 1 drop both eyes bid  b12 1000 mcg daily  ?Eliquis?       Allergies   Allergen Reactions     Ciprofloxacin      Other reaction(s): Dizziness     Hmg-Coa-R Inhibitors      Myalgias. Zocor, Crestor, Mevacor, Lipitor.        Social hx:  Unable, intubated/sedated.  Fam hs:  Unable, intubated/sedated  ROS: unable, intubated/sedated.           Physical Examination:   Temp:  [97  F (36.1  C)] 97  F (36.1  C)  Pulse:  [112-144] 112  Heart Rate:  [112-133] 116  Resp:  [14-32] 18  BP: ()/() 150/100  SpO2:  [96 %-100 %] 100 %    Intake/Output Summary (Last 24 hours) at 8/4/2019 0152  Last data filed at 8/4/2019 0148  Gross per 24 hour    Intake 1000 ml   Output --   Net 1000 ml     Wt Readings from Last 4 Encounters:   08/03/19 70.3 kg (155 lb)     BP - Mean:  [] 114  Resp: 18    Recent Labs   Lab 08/03/19  2200   O2PER 2L NC       GEN: no acute distress, intubated/sedated   HEENT: head ncat, sclera anicteric, OP patent, trachea midline   PULM: unlabored synchronous with vent, clear anteriorly    CV/COR: RRR S1S2 no gallop,  No rub, no murmur  ABD: soft nontender, hypoactive bowel sounds, no mass  EXT:   Warm x4, no edema  NEURO: sedated, perrl, toes upgoing b/l but more pronounced on L, no posturing on  My visit--normal tone.    SKIN: no obvious rash  LINES: clean, dry intact         Data:   All data and imaging reviewed     ROUTINE ICU LABS (Last four results)  CMP  Recent Labs   Lab 08/03/19 2123      POTASSIUM 3.4   CHLORIDE 109   CO2 25   ANIONGAP 7   *   BUN 26   CR 1.06*   GFRESTIMATED 48*   GFRESTBLACK 56*   KADEEM 9.2   PROTTOTAL 6.8   ALBUMIN 3.1*   BILITOTAL 0.5   ALKPHOS 60   AST 30   ALT 18     CBC  Recent Labs   Lab 08/03/19 2123   WBC 10.1   RBC 4.27   HGB 11.9   HCT 39.5   MCV 93   MCH 27.9   MCHC 30.1*   RDW 16.0*        INR  Recent Labs   Lab 08/03/19 2123   INR 1.09     Arterial Blood Gas  Recent Labs   Lab 08/03/19  2200   O2PER 2L NC       All cultures:  No results for input(s): CULT in the last 168 hours.  Recent Results (from the past 24 hour(s))   CT Head w/o Contrast    Narrative    CT SCAN OF THE HEAD WITHOUT CONTRAST   8/3/2019 9:35 PM     HISTORY: Altered mental status. (LOC), unexplained    TECHNIQUE:  Axial images of the head and coronal reformations without  IV contrast material. Radiation dose for this scan was reduced using  automated exposure control, adjustment of the mA and/or kV according  to patient size, or iterative reconstruction technique.    COMPARISON: None.    FINDINGS:     Intracranial contents: There is diffuse parenchymal volume loss.   White matter changes are present in  the cerebral hemispheres that are  consistent with small vessel ischemic disease in this age patient.  There is low-density in the right insular cortex region. This is best  seen on axial images 16 and 17. This is consistent with a cortical  infarct. The age of this is uncertain. No hemorrhage or mass effect.    Visualized orbits/sinuses/mastoids:  The visualized portions of the  sinuses and mastoids appear normal.    Osseous structures/soft tissues:  There is no evidence of trauma.      Impression    IMPRESSION:   1. No intracranial hemorrhage.  2. Low dense area in the right insular cortex region. This is  consistent with an infarct. Age of this is uncertain. It could be  chronic.  3. There is diffuse parenchymal volume loss.  White matter changes are  present in the cerebral hemispheres that are consistent with small  vessel ischemic disease in this age patient.      CURTIS AVILA MD   CTA Head Neck with Contrast    Narrative    CT ANGIOGRAM OF THE HEAD AND NECK  8/3/2019 9:45 PM     HISTORY: Altered mental status. Code stroke    TECHNIQUE:  Precontrast localizing scans were followed by CT  angiography with an injection of 70 mL IV contrast with scans through  the head and neck.  Images were transferred to a separate 3-D  workstation where multiplanar reformations and 3-D images were  created.  Estimates of carotid stenoses are made relative to the  distal internal carotid artery diameters except as noted. Radiation  dose for this scan was reduced using automated exposure control,  adjustment of the mA and/or kV according to patient size, or iterative  reconstruction technique.    COMPARISON: None.    FINDINGS: Estimates of stenosis at the carotid bifurcations are  relative to the distal internal carotids.    Arch: Calcified atherosclerotic plaque is seen in the arch of the  aorta. Extensive calcified atherosclerotic plaque is seen at the  origin of the right subclavian artery.    Neck:  Right Carotid:  The right  common carotid artery is normal.  Calcified  atherosclerotic plaque is seen at the right carotid bifurcation. The  degree of stenosis is less than 50%..  The right internal carotid  artery is negative.     Left Carotid:  The left common carotid artery is unremarkable.   Calcified atherosclerotic plaque is seen at the left carotid  bifurcation. There is a severe stenosis at the bifurcation.  The left  internal carotid is negative.      Vertebrals:  The right vertebral artery is occluded proximally 2 cm  distal to the origin. There is collateral filling of the distal, V4  segment of the right vertebral artery. The left vertebral artery is a  large vessel and it supplies the basilar artery..    Head:  Right Carotid:No occluded vessels are seen. .    Left Carotid: There is a filling defect in the M1 segment of the left  middle cerebral artery consistent with thromboembolic disease. This is  causing a high-grade stenosis, near occlusion...  There is filling of  the inferior division of the left middle cerebral..    Basilar:  The basilar artery and its branches appear normal.     Miscellaneous: None      Impression    IMPRESSION:   1. Filling defect in the M1 segment of the left middle cerebral  leading to a high-grade stenosis. There is filling of inferior  division of the left middle cerebral.   2. High-grade stenosis at the left carotid bifurcation.  3. Mild stenosis right carotid bifurcation.  4. 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    Report was called to Dr. Gonzalez at 9:50 PM   XR Chest Port 1 View    Narrative    XR CHEST PORTABLE 1 VIEW   8/3/2019 10:45 PM     HISTORY: Post intubation.    COMPARISON: None.    FINDINGS: Supine portable chest. ET tube in place with tip  approximately 3 cm above the thomas. NG tube tip is near the  gastroesophageal junction. No pneumothorax. The heart is enlarged.  There is no pulmonary edema. Thoracic aorta  is calcified. Mild left  basilar atelectasis or scarring. The lungs are otherwise clear.      Impression    IMPRESSION: ET tube 3 cm above the thomas. NG tube tip near the  gastroesophageal junction.     Labs (personally reviewed/interpreted):  See a/p.  CXR (personally reviewed/interpreted):  NAD.  cxr and feeding tube in good position.  EKG (personally reviewed/interpreted):  118, a fib, nl axis, RBBB present.    D/w Dr. Gonzalez of [facility] ED and Dr. Rivero of neurology.    Billing: This patient is critically ill: Yes. Total critical care time today 60 min.

## 2019-08-04 NOTE — PROCEDURES
[facility]     Endovascular Surgical Neuroradiology Post-Procedure Note    Pre-Procedure Diagnosis:  Left MCA ischemic stroke  Post-Procedure Diagnosis:  Left MCA ischemic stroke    Procedure(s):   Endovascular treatment of acute ischemic stroke    - TICI Score: 3    Findings:  Left M1 occlusion. 2 passes with Penumbra aspiration with TICI 3 achieved. Left proximal ICA 70% stenosis due to atherosclerotic disease. Right CFA closure with Angio-Seal.    Primary Surgeon:  Mat Rivero  Secondary Surgeon:  Not applicable  Secondary Surgeon Review:  None  Fellow:  None  Additional Assistants:  None    Prior to the start of the procedure and with procedural staff participation, I verbally confirmed: the patient s identity using two indicators, relevant allergies, that the procedure was appropriate and matched the consent or emergent situation, and that the correct equipment/implants were available. Immediately prior to starting the procedure I conducted the Time Out with the procedural staff and re-confirmed the patient s name, procedure, and site/side. (The Joint Commission universal protocol was followed.)  Yes    PRU value: Not applicable    Anesthesia:  Performed by Anesthesia  Medications:  Lidocaine  Puncture site:  Right Femoral Artery    Fluoroscopy time (minutes):  17.4  Radiation dose (mGy):  1185.44    Contrast amount (mL):  50     Estimated blood loss (mL):  50    Closure:  Device    Mat Rivero  Pager:  [phone]

## 2019-08-04 NOTE — CONSULTS
[facility]      Stroke Consult Note    Reason for Consult:  Stroke Code    HPI  Nessa Chan is a 84 year old female who presents from TCU (admitted for recent right MCA ischemic stroke) after having a fall at 9 pm. She was noted to be talking immediately afterwards by staff and moving all extremity but shortly she had declined and could not speak and had severe right sided weakness. She continued to have symptoms on arrival to ED. Family not available and recent history of ischemic stroke was also not available.    Patient had airway compromise and intubated at [facility].    TPA Treatment   Administered. Risks and benefits of tPA were not discussed.     Endovascular Treatment  Endovascular treatment initiated for left M1 occlusion    Impression  Ischemic Stroke due to cardioembolism    Recommendations  -Post tPA protocol  -Keep SBP < 140  -CTH post thrombectomy  -Neuro checks; With change need stat CTH    Patient Follow-up    - final recommendation pending work-up      Please contact the Stroke Service with any questions.    Mat Rivero MD  Neurology    Text Page ([phone])  __________________________________________________    No past medical history on file.    No past surgical history on file.    Medications   Current Facility-Administered Medications   Medication     0.9% sodium chloride TABLE SOLN     heparin 10,000 units in 1000 mL NS     Facility-Administered Medications Ordered in Other Encounters   Medication     fentaNYL (PF) (SUBLIMAZE) injection     lactated ringers infusion     phenylephrine (NARCISO-SYNEPHRINE) injection     phenylephrine 0.2 mg/mL (mcg/kg/min) drip     rocuronium injection       No medications prior to admission.       Allergies   Allergies   Allergen Reactions     Ciprofloxacin      Other reaction(s): Dizziness     Hmg-Coa-R Inhibitors      Myalgias. Zocor, Crestor, Mevacor, Lipitor.        Family History       Social History   Social History     Socioeconomic History      Marital status:      Spouse name: Not on file     Number of children: Not on file     Years of education: Not on file     Highest education level: Not on file   Occupational History     Not on file   Social Needs     Financial resource strain: Not on file     Food insecurity:     Worry: Not on file     Inability: Not on file     Transportation needs:     Medical: Not on file     Non-medical: Not on file   Tobacco Use     Smoking status: Not on file   Substance and Sexual Activity     Alcohol use: Not on file     Drug use: Not on file     Sexual activity: Not on file   Lifestyle     Physical activity:     Days per week: Not on file     Minutes per session: Not on file     Stress: Not on file   Relationships     Social connections:     Talks on phone: Not on file     Gets together: Not on file     Attends [employer] service: Not on file     Active member of club or organization: Not on file     Attends meetings of clubs or organizations: Not on file     Relationship status: Not on file     Intimate partner violence:     Fear of current or ex partner: Not on file     Emotionally abused: Not on file     Physically abused: Not on file     Forced sexual activity: Not on file   Other Topics Concern     Not on file   Social History Narrative     Not on file          ROS  Review of systems not obtained due to patient factors - intubation    PHYSICAL EXAMINATION  B/P:       150/100            Resp:   18  Temp:      97    General:  Intubated sedated    HEENT:  normocephalic/atraumatic  Cardio:  irregularly irregular  Pulmonary:  BS clear  Abdomen:  soft  Extremities:  no edema  Skin:  intact     Neurologic  Intubated on sedation, not opening eyes  PERRL, +VOR  Not moving arm/leg to pain    Prior to arrival on televideo  Awake, mute, left gaze deviation, not following command  Left facial drooping  Left side moving well spont  Right side no movement to tough  Decr right sided pain sensation      Stroke  Scales      NIHSS  Interval baseline (08/04/19 0204)   Interval Comments     1a. Level of Consciousness 0-->Alert, keenly responsive   1b. LOC Questions 2-->Answers neither question correctly   1c. LOC Commands 2-->Performs neither task correctly   2.   Best Gaze 2-->Forced deviation, or total gaze paresis not overcome by the oculocephalic maneuver   3.   Visual 2-->Complete hemianopia   4.   Facial Palsy 2-->Partial paralysis (total or near-total paralysis of lower face)   5a. Motor Arm, Left 0-->No drift, limb holds 90 (or 45) degrees for full 10 secs   5b. Motor Arm, Right 4-->No movement   6a. Motor Leg, Left 0-->No drift, leg holds 30 degree position for full 5 secs   6b. Motor Leg, right 4-->No movement   7.   Limb Ataxia 0-->Absent   8.   Sensory 2-->Severe to total sensory loss, patient is not aware of being touched in the face, arm, and leg   9.   Best Language 3-->Mute, global aphasia, no usable speech or auditory comprehension   10. Dysarthria 2-->Severe dysarthria, patients speech is so slurred as to be unintelligible in the absence of or out of proportion to any dysphasia, or is mute/anarthric   11. Extinction and Inattention  1-->Visual, tactile, auditory, spatial, or personal inattention or extinction to bilateral simultaneous stimulation in one of the sensory modalities   Total 26 (08/04/19 0204)       Labs/Imaging  Labs and imaging were reviewed and used in developing plan; pertinent results included.  Data   CBC  WBC (10e9/L)   Date Value   08/03/2019 10.1    RBC Count (10e12/L)   Date Value   08/03/2019 4.27    Hemoglobin (g/dL)   Date Value   08/03/2019 11.9      Hematocrit (%)   Date Value   08/03/2019 39.5    Platelet Count (10e9/L)   Date Value   08/03/2019 319         BMP  Sodium (mmol/L)   Date Value   08/03/2019 141    Potassium (mmol/L)   Date Value   08/03/2019 3.4    Chloride (mmol/L)   Date Value   08/03/2019 109      Carbon Dioxide (mmol/L)   Date Value   08/03/2019 25    Glucose  (mg/dL)   Date Value   08/03/2019 236 (H)    Urea Nitrogen (mg/dL)   Date Value   08/03/2019 26      Creatinine (mg/dL)   Date Value   08/03/2019 1.06 (H)    Calcium (mg/dL)   Date Value   08/03/2019 9.2         INR Troponin I A1C   INR (no units)   Date Value   08/03/2019 1.09    Troponin I ES (ug/L)   Date Value   08/03/2019 <0.015    No results found for: A1C     Liver Panel  Protein Total (g/dL)   Date Value   08/03/2019 6.8    Albumin (g/dL)   Date Value   08/03/2019 3.1 (L)    Bilirubin Total (mg/dL)   Date Value   08/03/2019 0.5      Alkaline Phosphatase (U/L)   Date Value   08/03/2019 60    AST (U/L)   Date Value   08/03/2019 30    ALT (U/L)   Date Value   08/03/2019 18      No results found for: BILIDIRECT       Lipid Profile  No results found for: CHOL No results found for: HDL No results found for: LDL   No results found for: TRIG No results found for: CHOLHDLRATIO            I have personally spent a total of 50 minutes providing critical care services supervising this patient's stroke code activation.  I personally reviewed all lab values and radiology images.  I evaluated and directed care for the patient's critical condition..

## 2019-08-04 NOTE — ED NOTES
Pt presents to via EMS from TCU facility. Pt is unresponsive on arrival. EMS reports pt is at a TCU after a previous stroke. It is reported she is normally fully alert and independent. She presents to ED with left-sided fixed gaze and left sided decorticate -like posture. Maintaining airway at this time.

## 2019-08-04 NOTE — PHARMACY-ADMISSION MEDICATION HISTORY
Admission medication history interview status for the 8/4/2019  admission is complete. See EPIC admission navigator for prior to admission medications     Medication history source reliability:Good    Actions taken by pharmacist (provider contacted, etc):Added PTA med list using MAR from TCU ([place])     Additional medication history information not noted on PTA med list :  Added: Acetaminophen 650 mg, amlodipine 10 mg, aspirin 81 mg , bisacodyl 10 mg, carboxymethylcellulose 0.5 % opthalmic solution, cyanocobalamin 100 mcg, ezetimibe 10 mg, fenofibrate 100 mg, folic acid 1 mg, levothyroxine 75 mcg, magnesium hydroxide 400 mg/5ml, melatonin 3 mg, metoprolol tartrate 75 mg, omeprazole 20 mg, sertraline HCl 100 mg, timolol maleate solution 0.5 %.  She is scheduled for [facility] on 8/13/2019    Medication reconciliation/reorder completed by provider prior to medication history? No    Time spent in this activity: 30 minutes    Prior to Admission medications    Medication Sig Last Dose Taking? Auth Provider   acetaminophen (TYLENOL) 650 MG CR tablet Take 650 mg by mouth every 6 hours as needed for mild pain or fever  Yes Unknown, Entered By History   amLODIPine (NORVASC) 10 MG tablet Take 10 mg by mouth daily  Yes Unknown, Entered By History   aspirin 81 MG EC tablet Take 81 mg by mouth daily  Yes Unknown, Entered By History   bisacodyl (DULCOLAX) 10 MG suppository Place 10 mg rectally daily as needed for constipation  Yes Unknown, Entered By History   carboxymethylcellulose PF (REFRESH PLUS) 0.5 % ophthalmic solution Place 1 drop into both eyes daily as needed for dry eyes  Yes Unknown, Entered By History   cyanocobalamin (VITAMIN B-12) 1000 MCG tablet Take 1,000 mcg by mouth daily  Yes Unknown, Entered By History   ezetimibe (ZETIA) 10 MG tablet Take 10 mg by mouth daily  Yes Unknown, Entered By History   FENOFIBRATE PO Take 100 mg by mouth  Yes Unknown, Entered By History   folic acid (FOLVITE) 1 MG tablet  Take 1 mg by mouth daily  Yes Unknown, Entered By History   levothyroxine (SYNTHROID/LEVOTHROID) 75 MCG tablet Take 75 mcg by mouth daily  Yes Unknown, Entered By History   magnesium hydroxide (MILK OF MAGNESIA) 400 MG/5ML suspension Take 30 mLs by mouth daily as needed for constipation or heartburn  Yes Unknown, Entered By History   melatonin 3 MG tablet Take 6 mg by mouth nightly as needed for sleep  Yes Unknown, Entered By History   Metoprolol Tartrate 75 MG TABS Take 75 mg by mouth 2 times daily  Yes Unknown, Entered By History   omeprazole (PRILOSEC) 20 MG DR capsule Take 20 mg by mouth daily  Yes Unknown, Entered By History   sertraline (ZOLOFT) 100 MG tablet Take 100 mg by mouth daily  Yes Unknown, Entered By History   timolol maleate (TIMOPTIC) 0.5 % ophthalmic solution Place 1 drop into both eyes 2 times daily  Yes Unknown, Entered By History   tuberculin (TUBERSOL) 5 UNIT/0.1ML injection Inject 5 Units into the skin once   Unknown, Entered By History

## 2019-08-04 NOTE — PLAN OF CARE
Pt remains intubated, no change in neuro status, pt's  and son contacted by phone and updated on [facility] condition [facility] numbers found in care everywhere, as [facility], and the pt's TCU did not have contact information, nicardipine gtt started for b/p control, sedation off, see MAR, plan for MRI today, CT tonight

## 2019-08-04 NOTE — ANESTHESIA CARE TRANSFER NOTE
Patient: Nessa Chan    * No procedures listed *    Diagnosis: * No pre-op diagnosis entered *  Diagnosis Additional Information: No value filed.    Anesthesia Type:   General, ETT     Note:  Airway :ETT  Patient transferred to:ICU  Comments: Neuromuscular blockade reversed after TOF 1/4, TOF 4/4 post reversal with sugamadex, transported intubated with propofol infusion at 65 mcg/kg/min with monitored cart to CT for scheduled scan then to ICU for further monitoring. Patient accompanied with RT and two RN's to scans. Vitals Stable at handoff.ICU Handoff: Call for PAUSE to initiate/utilize ICU HANDOFF, Identified Patient, Identified Responsible Provider, Reviewed the Pertinent Medical History, Discussed Surgical Course, Reviewed Intra-OP Anesthesia Management and Issues during Anesthesia, Set Expectations for Post Procedure Period and Allowed Opportunity for Questions and Acknowledgement of Understanding      Vitals: (Last set prior to Anesthesia Care Transfer)    CRNA VITALS  8/4/2019 0143 - 8/4/2019 0218      8/4/2019             Resp Rate:  BP:  Temp:  SPO2:  HR:  14  109/66  36.9  99%  74                Electronically Signed By: DOMINICK Jacob CRNA  August 4, 2019  2:18 AM

## 2019-08-04 NOTE — PROGRESS NOTES
ICU interval note  DOS: 8/4/2019    All pertinent labs, imaging studies, physical examination, and medications have been reviewed by myself and the SICU team. The patient is critically ill by my assessment and requires ongoing ICU management and cares. An additional 30 minutes of critical care time, exclusive of procedures, spent in the ICU in the management and care of this patient on 8/4/2019.    No significant events since Dr Santana's note at 0326. Ms Chan remains intubated/sedated and minimally responsive. Exam shows her to be normocephalic/atraumatic with equal/round/reactive pupils. No scleral icterus. Pulse is irregularly irregular and she shows atrial fibrillation on the monitor. Chest is clear bilaterally. Abdomen is soft, nondistended. Extremities are warm and perfused without significant edema. Skin is warm and dry. She is moving her legs and left arm spontaneously at my evaluation; per RN she will withdraw with all four extremities.    CNS: Sedated on propofol at my evaluation.  - CVA: (L)MCA territory s/p TPA, aspiration. Continue cares per neurology plan.  - Pain: Hydromorphone prn.  - Sedation: Propofol infusion. Lighten as tolerated to get a clear neurologic exam.  Pulm: On SIMV 12/450/(5/5)/35.  - Acute respiratory failure: Continue ventilator support.   - VBG with PCO2 52. Will re-send a gas.  CV: 100s-130s/50s-60s, 90s. Per neurology, goal SBP <140; follow for updates.  - HTN: Metoprolol, prn hydralazine/labetalol.   - Paroxysmal atrial fibrillation: Continue metoprolol.  - Dyslipidemia: Continue statin.  FEN/GI: Abdomen benign.  - NPO for now. Likely will need feeding access placed.  : UOP aequate overall. Cr 1 (1.06). Electrolyte replacement protocols.  Heme: Hb 10.5 (11.9).   - Acute blood loss anemia: No indication to transfuse.  Musculoskeletal: Extremities warm, well-perfused.   Endocrine: Glucose 226-280.  - DM2: Has insulin sliding scale coverage; if unable to maintain <180 start  insulin infusion.  - Hypothyroidism: Continue levothyroxine.  ID: Afebrile, WBCs 12.9.  - UTI: Cultures pending. On ceftriaxone.  SICU: SCDs, PPI.    Joey Rollins MD, PhD  Surgical critical care  August 4, 2019, 10:38 AM

## 2019-08-04 NOTE — ED PROVIDER NOTES
History     Chief Complaint   Patient presents with     Fall     unresponsive    130/80  100  97%    HX CVA  deviation to left        Altered Mental Status     HPI  Nessa Chan is a 84 year old female who presents via EMS for altered mental status. The patient currently lives at a TCU when she had a sudden change in mentation. The patient had an unwitnessed fall and was found down by a nurse assistant. At that point she was normal and talkative and denied complaints and was helped up and into bed with assistance. Within 10 minutes she then had a sudden decompinsation with eyes deviated to the left and was unresponsive. No vomiting. No signs of trauma. The patient is unable to provide any further history.    Allergies:  Allergies   Allergen Reactions     Ciprofloxacin      Other reaction(s): Dizziness     Hmg-Coa-R Inhibitors      Myalgias. Zocor, Crestor, Mevacor, Lipitor.        Problem List:    There are no active problems to display for this patient.       Past Medical History:    No past medical history on file.    Past Surgical History:    No past surgical history on file.    Family History:    No family history on file.    Social History:  Marital Status:   [2]  Social History     Tobacco Use     Smoking status: Not on file   Substance Use Topics     Alcohol use: Not on file     Drug use: Not on file        Medications:      No current outpatient medications on file.      Review of Systems  Unable to obtain due to AMS    Physical Exam   BP: (!) 144/115  Pulse: 144  Heart Rate: 129  Temp: 97  F (36.1  C)  Resp: (!) 31  Weight: 70.3 kg (155 lb)  SpO2: 98 %      Physical Exam   Constitutional: She appears well-developed and well-nourished.   HENT:   Head: Normocephalic and atraumatic.   Right Ear: External ear normal.   Left Ear: External ear normal.   Nose: Nose normal.   Eyes: Conjunctivae are normal. No scleral icterus.   Neck: Normal range of motion.   Cardiovascular: An irregularly irregular  rhythm present. Tachycardia present.   Pulmonary/Chest: Effort normal. No stridor. No respiratory distress.   Abdominal: Soft. She exhibits no distension.   Neurological: She is unresponsive.   Decerebrate posturing with painful stimuli to right upper extremity. Decorticate posturing with painful stimuli to right lower extremity. Localizes pain on the left and has some spontaneous movement of the left upper and lower extremities. Eyes deviated to the left. Not following commands   Skin: Skin is warm and dry. She is not diaphoretic.   Psychiatric: She has a normal mood and affect. Her behavior is normal.   Nursing note and vitals reviewed.      ED Course        [facility]    Intubation  Performed by: Donavan Gonzalez MD  Authorized by: Donavan Gonzalez MD     ED EVALUATION:      Difficult Airway?: No    ASA Class: Class 3- Severe systemic disease, definite functional limitations    NPO Status: appropriately NPO for procedure  UNIVERSAL PROTOCOL   Site Marked: NA  Prior Images Obtained and Reviewed:  NA  Required items: Required blood products, implants, devices and special equipment available    Patient identity confirmed:  Arm band and hospital-assigned identification number  Patient was reevaluated immediately before administering moderate or deep sedation or anesthesia  Confirmation Checklist:  Patient's identity using two indicators, procedure was appropriate and matched the consent or emergent situation and correct equipment/implants were available  Time out: Immediately prior to the procedure a time out was called      PRE-PROCEDURE DETAILS     Patient status:  Altered mental status    Paralytics:  Rocuronium      PROCEDURE DETAILS     Preoxygenation:  Nonrebreather mask    CPR in progress: no      Intubation method:  Oral    Oral intubation technique:  Direct    Laryngoscope blade:  Mac 4    Tube size (mm):  7.5    Tube type:  Cuffed    Number of attempts:  1    Cricoid  pressure: no      Tube visualized through cords: yes      PLACEMENT ASSESSMENT     ETT to lip:  22    Tube secured with:  ETT montiel    Breath sounds:  Equal and absent over the epigastrium    Placement verification: chest rise, CXR verification, equal breath sounds and ETCO2 detector      CXR findings:  ETT in proper place  PROCEDURE   Patient Tolerance:  Patient tolerated the procedure well with no immediate complications    Time of Sedation in Minutes by Physician:  30                 EKG Interpretation:      Interpreted by Donavan Gonzalez  Time reviewed: 2155  Symptoms at time of EKG: AMS   Rhythm: atrial fibrillation - rapid  Rate: 118  Axis: Normal  Ectopy: none  Conduction: right bundle branch block (complete)  ST Segments/ T Waves: No acute ischemic changes  Q Waves: II and III  Comparison to prior: No old EKG available    Clinical Impression: Atrial fibrillation with right bundle branch block       The patient has stroke symptoms:           ED Stroke specific documentation           NIHSS PDF          Protocol PDF     Patient last known well time: 30 minutes prior to arrival  ED Provider first to bedside at: upon arrival  CT Results received at: 2150    tPA:   Administered. Delay due to HTN management and initially unable to obtain history/no family present. TPA given emergently.    If treating with tPA: Ensure SBP<185 and DBP<105 prior to treatment with IV tPA.  Administering IV tPA after treatment with IV labetalol, hydralazine, or nicardipine is reasonable once BP control is established.    Endovascular Retrieval:  Endovascular treatment initiated for M1 left MCA occlusion    National [employer] Stroke Scale (Baseline)  Time Performed: upon arrival      Score    Level of consciousness: (3)   Responds w/ reflex motor/autonomic effect or no response    LOC questions: (2)   Answers neither question correctly    LOC commands: (2)   Performs neither task correctly    Best gaze: (2)   Forced  deviation    Visual: (0)   No visual loss    Facial palsy: (0)   Normal symmetrical movements    Motor arm (left): (0)   No drift    Motor arm (right): (4)   No movement    Motor leg (left): (0)   No drift    Motor leg (right): (4)   No movement    Limb ataxia: (2)   Present in two limbs    Sensory: (0)   Normal- no sensory loss    Best language: (3)   Mute- global aphasia    Dysarthria: UN Intubated or other physical barrier: intubated    Extinction and inattention: (2)   Profound marissa-inattention / extinction > one modality        Total Score:  24        Stroke Mimics were considered (including migraine headache, seizure disorder, hypoglycemia (or hyperglycemia), head or spinal trauma, CNS infection, Toxin ingestion and shock state (e.g. sepsis) .      Evaluation/Treatement was delayed due to: treating hypertension          Results for orders placed or performed during the hospital encounter of 08/03/19 (from the past 24 hour(s))   Glucose by meter   Result Value Ref Range    Glucose 233 (H) 70 - 99 mg/dL   CBC with platelets differential   Result Value Ref Range    WBC 10.1 4.0 - 11.0 10e9/L    RBC Count 4.27 3.8 - 5.2 10e12/L    Hemoglobin 11.9 11.7 - 15.7 g/dL    Hematocrit 39.5 35.0 - 47.0 %    MCV 93 78 - 100 fl    MCH 27.9 26.5 - 33.0 pg    MCHC 30.1 (L) 31.5 - 36.5 g/dL    RDW 16.0 (H) 10.0 - 15.0 %    Platelet Count 319 150 - 450 10e9/L    Diff Method Automated Method     % Neutrophils 71.7 %    % Lymphocytes 16.6 %    % Monocytes 8.7 %    % Eosinophils 1.6 %    % Basophils 0.5 %    % Immature Granulocytes 0.9 %    Nucleated RBCs 0 0 /100    Absolute Neutrophil 7.3 1.6 - 8.3 10e9/L    Absolute Lymphocytes 1.7 0.8 - 5.3 10e9/L    Absolute Monocytes 0.9 0.0 - 1.3 10e9/L    Absolute Eosinophils 0.2 0.0 - 0.7 10e9/L    Absolute Basophils 0.1 0.0 - 0.2 10e9/L    Abs Immature Granulocytes 0.1 0 - 0.4 10e9/L    Absolute Nucleated RBC 0.0    Comprehensive metabolic panel   Result Value Ref Range    Sodium 141 133  - 144 mmol/L    Potassium 3.4 3.4 - 5.3 mmol/L    Chloride 109 94 - 109 mmol/L    Carbon Dioxide 25 20 - 32 mmol/L    Anion Gap 7 3 - 14 mmol/L    Glucose 236 (H) 70 - 99 mg/dL    Urea Nitrogen 26 7 - 30 mg/dL    Creatinine 1.06 (H) 0.52 - 1.04 mg/dL    GFR Estimate 48 (L) >60 mL/min/[1.73_m2]    GFR Estimate If Black 56 (L) >60 mL/min/[1.73_m2]    Calcium 9.2 8.5 - 10.1 mg/dL    Bilirubin Total 0.5 0.2 - 1.3 mg/dL    Albumin 3.1 (L) 3.4 - 5.0 g/dL    Protein Total 6.8 6.8 - 8.8 g/dL    Alkaline Phosphatase 60 40 - 150 U/L    ALT 18 0 - 50 U/L    AST 30 0 - 45 U/L   Troponin I   Result Value Ref Range    Troponin I ES <0.015 0.000 - 0.045 ug/L   INR   Result Value Ref Range    INR 1.09 0.86 - 1.14   CT Head w/o Contrast    Narrative    CT SCAN OF THE HEAD WITHOUT CONTRAST   8/3/2019 9:35 PM     HISTORY: Altered mental status. (LOC), unexplained    TECHNIQUE:  Axial images of the head and coronal reformations without  IV contrast material. Radiation dose for this scan was reduced using  automated exposure control, adjustment of the mA and/or kV according  to patient size, or iterative reconstruction technique.    COMPARISON: None.    FINDINGS:     Intracranial contents: There is diffuse parenchymal volume loss.   White matter changes are present in the cerebral hemispheres that are  consistent with small vessel ischemic disease in this age patient.  There is low-density in the right insular cortex region. This is best  seen on axial images 16 and 17. This is consistent with a cortical  infarct. The age of this is uncertain. No hemorrhage or mass effect.    Visualized orbits/sinuses/mastoids:  The visualized portions of the  sinuses and mastoids appear normal.    Osseous structures/soft tissues:  There is no evidence of trauma.      Impression    IMPRESSION:   1. No intracranial hemorrhage.  2. Low dense area in the right insular cortex region. This is  consistent with an infarct. Age of this is uncertain. It could  be  chronic.  3. There is diffuse parenchymal volume loss.  White matter changes are  present in the cerebral hemispheres that are consistent with small  vessel ischemic disease in this age patient.      CURTIS AVILA MD   CTA Head Neck with Contrast    Narrative    CT ANGIOGRAM OF THE HEAD AND NECK  8/3/2019 9:45 PM     HISTORY: Altered mental status. Code stroke    TECHNIQUE:  Precontrast localizing scans were followed by CT  angiography with an injection of 70 mL IV contrast with scans through  the head and neck.  Images were transferred to a separate 3-D  workstation where multiplanar reformations and 3-D images were  created.  Estimates of carotid stenoses are made relative to the  distal internal carotid artery diameters except as noted. Radiation  dose for this scan was reduced using automated exposure control,  adjustment of the mA and/or kV according to patient size, or iterative  reconstruction technique.    COMPARISON: None.    FINDINGS: Estimates of stenosis at the carotid bifurcations are  relative to the distal internal carotids.    Arch: Calcified atherosclerotic plaque is seen in the arch of the  aorta. Extensive calcified atherosclerotic plaque is seen at the  origin of the right subclavian artery.    Neck:  Right Carotid:  The right common carotid artery is normal.  Calcified  atherosclerotic plaque is seen at the right carotid bifurcation. The  degree of stenosis is less than 50%..  The right internal carotid  artery is negative.     Left Carotid:  The left common carotid artery is unremarkable.   Calcified atherosclerotic plaque is seen at the left carotid  bifurcation. There is a severe stenosis at the bifurcation.  The left  internal carotid is negative.      Vertebrals:  The right vertebral artery is occluded proximally 2 cm  distal to the origin. There is collateral filling of the distal, V4  segment of the right vertebral artery. The left vertebral artery is a  large vessel and it supplies the  basilar artery..    Head:  Right Carotid:No occluded vessels are seen. .    Left Carotid: There is a filling defect in the M1 segment of the left  middle cerebral artery consistent with thromboembolic disease. This is  causing a high-grade stenosis, near occlusion...  There is filling of  the inferior division of the left middle cerebral..    Basilar:  The basilar artery and its branches appear normal.     Miscellaneous: None      Impression    IMPRESSION:   1. Filling defect in the M1 segment of the left middle cerebral  leading to a high-grade stenosis. There is filling of inferior  division of the left middle cerebral.   2. High-grade stenosis at the left carotid bifurcation.  3. Mild stenosis right carotid bifurcation.  4. 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    Report was called to Dr. Gonzalez at 9:50 PM   Blood gas venous   Result Value Ref Range    Ph Venous 7.46 (H) 7.32 - 7.43 pH    PCO2 Venous 39 (L) 40 - 50 mm Hg    PO2 Venous 46 25 - 47 mm Hg    Bicarbonate Venous 28 21 - 28 mmol/L    Base Excess Venous 3.8 mmol/L    FIO2 2L NC    UA reflex to Microscopic and Culture   Result Value Ref Range    Color Urine Yellow     Appearance Urine Cloudy     Glucose Urine >499 (A) NEG^Negative mg/dL    Bilirubin Urine Negative NEG^Negative    Ketones Urine Negative NEG^Negative mg/dL    Specific Gravity Urine 1.026 1.003 - 1.035    Blood Urine Small (A) NEG^Negative    pH Urine 5.0 5.0 - 7.0 pH    Protein Albumin Urine 100 (A) NEG^Negative mg/dL    Urobilinogen mg/dL 0.0 0.0 - 2.0 mg/dL    Nitrite Urine Positive (A) NEG^Negative    Leukocyte Esterase Urine Large (A) NEG^Negative    Source Catheterized Urine     RBC Urine 12 (H) 0 - 2 /HPF    WBC Urine 2,635 (H) 0 - 5 /HPF    WBC Clumps Present (A) NEG^Negative /HPF    Squamous Epithelial /HPF Urine 3 (H) 0 - 1 /HPF    Mucous Urine Present (A) NEG^Negative /LPF       Medications   niCARdipine  (CARDENE) 40 mg in sodium chloride 0.9 % 200 mL infusion (0 mg/hr Intravenous Hold 8/3/19 2241)   labetalol (NORMODYNE/TRANDATE) syringe 10 mg (10 mg Intravenous Not Given 8/3/19 2216)   propofol (DIPRIVAN) infusion (0 mcg/kg/min × 70.3 kg Intravenous Stopped 8/3/19 2231)   alteplase (ACTIVASE) bolus dose 6 mg (6 mg Intravenous Given 8/3/19 2218)     Followed by   alteplase (ACTIVASE) infusion 57 mg (0 mg Intravenous ED Infusing on Admission/transfer 8/3/19 2258)     Followed by   0.9% sodium chloride BOLUS (has no administration in time range)   iopamidol (ISOVUE-370) solution 70 mL (70 mLs Intravenous Given 8/3/19 2140)   sodium chloride 0.9 % bag 500mL for CT scan flush use (100 mLs Intravenous Given 8/3/19 2141)   labetalol (NORMODYNE/TRANDATE) syringe 10 mg (10 mg Intravenous Given 8/3/19 2201)   metoprolol (LOPRESSOR) injection 5 mg (5 mg Intravenous Given 8/3/19 2228)   propofol (DIPRIVAN) injection 10 mg/mL vial (70 mg Intravenous Given 8/3/19 2219)   rocuronium injection 100 mg (100 mg Intravenous Given 8/3/19 2220)       Assessments & Plan (with Medical Decision Making)   84 year old female who presents for altered mental status. I met the patient in room 1 upon EMS arrival. The patient was brought immediately to CT for CT head. I viewed the images in real time, no intracranial hemorrhage or mass effect.  I spoke on the phone with the reading radiologist who saw a clot with the left MCA.  I consulted with the on-call stroke physician who was able to evaluate the patient through tele-stroke.  He recommended thrombectomy and TPA.  There is no family to provide consent and the patient was given TPA however there was some delay as she required IV nicardipine to help with her blood pressure.  The patient was intubated as above because of shallow respirations and significant clot.  There was some delay in the transfer.  I was able to discuss the case with the intensivist at [facility], Dr. Ambriz who  accepted the patient in transfer.  The delay was because it is we are trying to make sure the patient would have a receiving neurologist waiting for her upon arrival.  I spoke with Dr. Rivero with neurology who assured me that this would be the case and the patient was sent via ambulance for further care.    I have reviewed the nursing notes.    I have reviewed the findings, diagnosis, plan and need for follow up with the patient.     Critical Care Addendum    My initial assessment, based on my review of prehospital provider report, review of nursing observations, review of vital signs, focused history and physical exam, established that Nessa Chan has altered mental status and focal neurologic abnormalities, which requires immediate intervention, and therefore she is critically ill.     After the initial assessment, the care team initiated multiple lab tests, initiated medication therapy with tPA, labetalol, performed advanced airway management and consulted with neurology and ICU to provide stabilization care. Due to the critical nature of this patient, I reassessed nursing observations, vital signs, physical exam, mental status and neurologic status multiple times prior to her disposition.     Time also spent performing documentation, reviewing test results, discussion with consultants and coordination of care.     Critical care time (excluding teaching time and procedures): 60 minutes.        Medication List      There are no discharge medications for this visit.         Final diagnoses:   Cerebrovascular accident (CVA) due to embolism of left middle cerebral artery (H)       8/3/2019   [facility] EMERGENCY DEPARTMENT     Donavan Gonzalez MD  08/04/19 0354

## 2019-08-04 NOTE — ANESTHESIA POSTPROCEDURE EVALUATION
Patient: Nessa Chan    * No procedures listed *    Diagnosis:* No pre-op diagnosis entered *  Diagnosis Additional Information: No value filed.    Anesthesia Type:  General, ETT    Note:  Anesthesia Post Evaluation    Patient location during evaluation: ICU  Patient participation: Unable to participate in evaluation secondary to underlying medical condition  Post-procedure mental status: intubated and sedated.  Pain management: adequate  Airway patency: patent  Cardiovascular status: acceptable  Respiratory status: acceptable and intubated  Hydration status: acceptable  PONV: none             Last vitals:  There were no vitals filed for this visit.      Electronically Signed By: Filippo Alejandre MD  August 4, 2019  3:03 AM

## 2019-08-04 NOTE — PROGRESS NOTES
Took over cares of patient at 1500. No neuro changes from previous RN. Patient is intubated , with no sedation, does not follow commends, but withdraws to pain on all extremities, left gaze. ICU and neurology MD's updated the family. Currently patient in MRI. She is on nicardipin drip to keep SBP less than 140.

## 2019-08-04 NOTE — SIGNIFICANT EVENT
Down to MRI with patient.  Patient with labile BP's during MRI.  On and off of nicardipene during road trip.  See VS flowsheet and MAR for details.

## 2019-08-04 NOTE — ANESTHESIA PREPROCEDURE EVALUATION
Anesthesia Pre-Procedure Evaluation    Patient: Nessa Chan   MRN: [id number] : 1935          Preoperative Diagnosis: * No pre-op diagnosis entered *    * No procedures listed *    No past medical history on file.  No past surgical history on file.      Echo 2019    CONCLUSION:    Echo 2019 16:13.     Normal LV size and systolic function.     Mild to moderate concentric LVH.     Normal RV size and function.     Severe LA dilatation.     Mild mitral regurgitation.     Mild to moderate tricuspid regurgitation.     Negative bubble study.            Left Ventricular Ejection Fraction: 55 %     Head CT  CT SCAN OF THE HEAD WITHOUT CONTRAST   8/3/2019 9:35 PM      HISTORY: Altered mental status. (LOC), unexplained     TECHNIQUE:  Axial images of the head and coronal reformations without  IV contrast material. Radiation dose for this scan was reduced using  automated exposure control, adjustment of the mA and/or kV according  to patient size, or iterative reconstruction technique.     COMPARISON: None.     FINDINGS:      Intracranial contents: There is diffuse parenchymal volume loss.   White matter changes are present in the cerebral hemispheres that are  consistent with small vessel ischemic disease in this age patient.  There is low-density in the right insular cortex region. This is best  seen on axial images 16 and 17. This is consistent with a cortical  infarct. The age of this is uncertain. No hemorrhage or mass effect.     Visualized orbits/sinuses/mastoids:  The visualized portions of the  sinuses and mastoids appear normal.     Osseous structures/soft tissues:  There is no evidence of trauma.                                                                      IMPRESSION:   1. No intracranial hemorrhage.  2. Low dense area in the right insular cortex region. This is  consistent with an infarct. Age of this is uncertain. It could be  chronic.  3. There is diffuse parenchymal volume loss.   White matter changes are  present in the cerebral hemispheres that are consistent with small  vessel ischemic disease in this age patient.     Head/ neck CT  FINDINGS: Estimates of stenosis at the carotid bifurcations are  relative to the distal internal carotids.     Arch: Calcified atherosclerotic plaque is seen in the arch of the  aorta. Extensive calcified atherosclerotic plaque is seen at the  origin of the right subclavian artery.     Neck:  Right Carotid:  The right common carotid artery is normal.  Calcified  atherosclerotic plaque is seen at the right carotid bifurcation. The  degree of stenosis is less than 50%..  The right internal carotid  artery is negative.      Left Carotid:  The left common carotid artery is unremarkable.   Calcified atherosclerotic plaque is seen at the left carotid  bifurcation. There is a severe stenosis at the bifurcation.  The left  internal carotid is negative.       Vertebrals:  The right vertebral artery is occluded proximally 2 cm  distal to the origin. There is collateral filling of the distal, V4  segment of the right vertebral artery. The left vertebral artery is a  large vessel and it supplies the basilar artery..     Head:  Right Carotid:No occluded vessels are seen. .     Left Carotid: There is a filling defect in the M1 segment of the left  middle cerebral artery consistent with thromboembolic disease. This is  causing a high-grade stenosis, near occlusion...  There is filling of  the inferior division of the left middle cerebral..     Basilar:  The basilar artery and its branches appear normal.      Miscellaneous: None                                                                      IMPRESSION:   1. Filling defect in the M1 segment of the left middle cerebral  leading to a high-grade stenosis. There is filling of inferior  division of the left middle cerebral.   2. High-grade stenosis at the left carotid bifurcation.  3. Mild stenosis right carotid  bifurcation.  4. 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    Anesthesia Evaluation     . Pt has had prior anesthetic.            ROS/MED HX    ENT/Pulmonary:       Neurologic: Comment: Recent stroke - patient in TCU.  Had a fall, unresponsive or arrival to ER, Left sided fixed gaze, left sided decorticate posturing.     (+)CVA     Cardiovascular: Comment: RBBB  Giant cell arteritis    Patient given TPA earlier today at [facility]    (+) Dyslipidemia, hypertension-Peripheral Vascular Disease (bilateral carotid disease)---. Taking blood thinners : . . . :. dysrhythmias a-fib and a-flutter, .       METS/Exercise Tolerance:     Hematologic:         Musculoskeletal: Comment: Sciatica  osteoporosis        GI/Hepatic:         Renal/Genitourinary:     (+) chronic renal disease, type: CRI,       Endo:     (+) type II DM thyroid problem hypothyroidism, .      Psychiatric:     (+) psychiatric history depression      Infectious Disease:         Malignancy:         Other:                          Physical Exam  Normal systems: pulmonary and dental    Airway   Comment: intubated    Dental     Cardiovascular   Rhythm and rate: regular and normal      Pulmonary    breath sounds clear to auscultation            Lab Results   Component Value Date    WBC 10.1 08/03/2019    HGB 11.9 08/03/2019    HCT 39.5 08/03/2019     08/03/2019     08/03/2019    POTASSIUM 3.4 08/03/2019    CHLORIDE 109 08/03/2019    CO2 25 08/03/2019    BUN 26 08/03/2019    CR 1.06 (H) 08/03/2019     (H) 08/03/2019    KADEEM 9.2 08/03/2019    ALBUMIN 3.1 (L) 08/03/2019    PROTTOTAL 6.8 08/03/2019    ALT 18 08/03/2019    AST 30 08/03/2019    ALKPHOS 60 08/03/2019    BILITOTAL 0.5 08/03/2019    INR 1.09 08/03/2019       Preop Vitals  BP Readings from Last 3 Encounters:   08/03/19 (!) 150/100    Pulse Readings from Last 3 Encounters:   08/03/19 112      Resp Readings from Last 3  Encounters:   08/03/19 18    SpO2 Readings from Last 3 Encounters:   08/03/19 100%      Temp Readings from Last 1 Encounters:   08/03/19 36.1  C (97  F) (Axillary)    Ht Readings from Last 1 Encounters:   No data found for Ht      Wt Readings from Last 1 Encounters:   08/03/19 70.3 kg (155 lb)    There is no height or weight on file to calculate BMI.       Anesthesia Plan      History & Physical Review  History and physical reviewed and following examination; no interval change.    ASA Status:  4 emergent.    NPO Status:  Unknown    Plan for General and ETT with Inhalation induction. Maintenance will be Balanced.    PONV prophylaxis:  Ondansetron (or other 5HT-3)  Patient arrived in interventional suite, intubated.   No family present.    Hx obtained from chart      Postoperative Care  Postoperative pain management:  IV analgesics.      Consents  Anesthetic plan, risks, benefits and alternatives discussed with:  Implied consent/emergency..                 Filippo Alejandre MD

## 2019-08-04 NOTE — PLAN OF CARE
Pt transferred from [facility] ED. PT has received TPA en route and was intubated prior to admission. Pt then proceeded to IR for a thrombectomy, remaining intubated. Pt sedated on propofol. No movement to RUE to painful stimuli. Pt had a previous stroke 5 days ago, affected on rt side.Pt withdraws to pain on LUE RLE LLE. Wiggles toes spontaneously. Pupils PERRL. Positive cough, overbreathing the vent.Rt groin site c/d/I. Eccymotic from previous interventionWas told by Dr Rivero that family was notified but have not seen them during the night.Adequate urine output.

## 2019-08-04 NOTE — PHARMACY-ADMISSION MEDICATION HISTORY
Admission medication history interview status for the 8/4/2019  admission is complete. See EPIC admission navigator for prior to admission medications     Medication history source reliability:Good    Actions taken by pharmacist (provider contacted, etc): PTA med list completed per MAR from [facility].      Additional medication history information not noted on PTA med list :None    Medication reconciliation/reorder completed by provider prior to medication history? No    Time spent in this activity: 20 minutes    Prior to Admission medications    Medication Sig Last Dose Taking? Auth Provider   acetaminophen (TYLENOL) 650 MG CR tablet Take 650 mg by mouth every 6 hours as needed for mild pain or fever 8/3/2019 at AM Yes Unknown, Entered By History   amLODIPine (NORVASC) 10 MG tablet Take 10 mg by mouth daily 8/3/2019 at AM Yes Unknown, Entered By History   aspirin 81 MG EC tablet Take 81 mg by mouth daily 8/3/2019 at AM Yes Unknown, Entered By History   bisacodyl (DULCOLAX) 10 MG suppository Place 10 mg rectally daily as needed for constipation  at PRN Yes Unknown, Entered By History   carboxymethylcellulose PF (REFRESH PLUS) 0.5 % ophthalmic solution Place 1 drop into both eyes daily as needed for dry eyes 8/3/2019 at AM Yes Unknown, Entered By History   cyanocobalamin (VITAMIN B-12) 1000 MCG tablet Take 1,000 mcg by mouth daily 8/3/2019 at AM Yes Unknown, Entered By History   ezetimibe (ZETIA) 10 MG tablet Take 10 mg by mouth daily 8/3/2019 at AM Yes Unknown, Entered By History   FENOFIBRATE PO Take 100 mg by mouth daily  8/3/2019 at AM Yes Unknown, Entered By History   folic acid (FOLVITE) 1 MG tablet Take 1 mg by mouth daily 8/3/2019 at AM Yes Unknown, Entered By History   levothyroxine (SYNTHROID/LEVOTHROID) 75 MCG tablet Take 75 mcg by mouth daily 8/3/2019 at AM Yes Unknown, Entered By History   magnesium hydroxide (MILK OF MAGNESIA) 400 MG/5ML suspension Take 30 mLs by mouth daily as needed for  constipation or heartburn  at PRN Yes Unknown, Entered By History   melatonin 3 MG tablet Take 6 mg by mouth nightly as needed for sleep 8/2/2019 at PM Yes Unknown, Entered By History   Metoprolol Tartrate 75 MG TABS Take 75 mg by mouth 2 times daily 8/3/2019 at AM Yes Unknown, Entered By History   omeprazole (PRILOSEC) 20 MG DR capsule Take 20 mg by mouth daily 8/3/2019 at AM Yes Unknown, Entered By History   sertraline (ZOLOFT) 100 MG tablet Take 100 mg by mouth daily 8/3/2019 at AM Yes Unknown, Entered By History   timolol maleate (TIMOPTIC) 0.5 % ophthalmic solution Place 1 drop into both eyes 2 times daily 8/3/2019 at AM Yes Unknown, Entered By History

## 2019-08-04 NOTE — IR NOTE
Interventional Radiology Intra-procedural Nursing Note     Patient Name:  Nessa Chan    Medical Record Number: [id number]  Today's Date:  August 4, 2019  Procedure: Carotid Cerebral Angiogram Bilateral for Left MCA Occlusion  Start Time: 0049  End of procedure time: 0145  Report given to: [facility]  Time pt departs:  0215    **Stroke Intervention Done under General Anesthesia. See Anesthesia documentation for hemodynamic monitoring and sedation.**     Notes: Pt. Transferred from [facility]    Patient brought into IR room # 3 at 0050 following head CT, CT perfusion.     Emergency consent per Mat Rivero MD.      8f sheath 0107  Clot ID [id number]  Pump on 0125  Pump off 0129  Pump on 0135  Pump off 0139  Recannulization 0139  TICI 3       Allergies   Allergen Reactions     Ciprofloxacin      Other reaction(s): Dizziness     Hmg-Coa-R Inhibitors      Myalgias. Zocor, Crestor, Mevacor, Lipitor.        Labs reviewed:  Results for orders placed or performed during the hospital encounter of 08/03/19 (from the past 24 hour(s))   Glucose by meter   Result Value Ref Range    Glucose 233 (H) 70 - 99 mg/dL   CBC with platelets differential   Result Value Ref Range    WBC 10.1 4.0 - 11.0 10e9/L    RBC Count 4.27 3.8 - 5.2 10e12/L    Hemoglobin 11.9 11.7 - 15.7 g/dL    Hematocrit 39.5 35.0 - 47.0 %    MCV 93 78 - 100 fl    MCH 27.9 26.5 - 33.0 pg    MCHC 30.1 (L) 31.5 - 36.5 g/dL    RDW 16.0 (H) 10.0 - 15.0 %    Platelet Count 319 150 - 450 10e9/L    Diff Method Automated Method     % Neutrophils 71.7 %    % Lymphocytes 16.6 %    % Monocytes 8.7 %    % Eosinophils 1.6 %    % Basophils 0.5 %    % Immature Granulocytes 0.9 %    Nucleated RBCs 0 0 /100    Absolute Neutrophil 7.3 1.6 - 8.3 10e9/L    Absolute Lymphocytes 1.7 0.8 - 5.3 10e9/L    Absolute Monocytes 0.9 0.0 - 1.3 10e9/L    Absolute Eosinophils 0.2 0.0 - 0.7 10e9/L    Absolute Basophils 0.1 0.0 - 0.2 10e9/L    Abs Immature Granulocytes 0.1 0 - 0.4 10e9/L     Absolute Nucleated RBC 0.0    Comprehensive metabolic panel   Result Value Ref Range    Sodium 141 133 - 144 mmol/L    Potassium 3.4 3.4 - 5.3 mmol/L    Chloride 109 94 - 109 mmol/L    Carbon Dioxide 25 20 - 32 mmol/L    Anion Gap 7 3 - 14 mmol/L    Glucose 236 (H) 70 - 99 mg/dL    Urea Nitrogen 26 7 - 30 mg/dL    Creatinine 1.06 (H) 0.52 - 1.04 mg/dL    GFR Estimate 48 (L) >60 mL/min/[1.73_m2]    GFR Estimate If Black 56 (L) >60 mL/min/[1.73_m2]    Calcium 9.2 8.5 - 10.1 mg/dL    Bilirubin Total 0.5 0.2 - 1.3 mg/dL    Albumin 3.1 (L) 3.4 - 5.0 g/dL    Protein Total 6.8 6.8 - 8.8 g/dL    Alkaline Phosphatase 60 40 - 150 U/L    ALT 18 0 - 50 U/L    AST 30 0 - 45 U/L   Troponin I   Result Value Ref Range    Troponin I ES <0.015 0.000 - 0.045 ug/L   INR   Result Value Ref Range    INR 1.09 0.86 - 1.14   CT Head w/o Contrast    Narrative    CT SCAN OF THE HEAD WITHOUT CONTRAST   8/3/2019 9:35 PM     HISTORY: Altered mental status. (LOC), unexplained    TECHNIQUE:  Axial images of the head and coronal reformations without  IV contrast material. Radiation dose for this scan was reduced using  automated exposure control, adjustment of the mA and/or kV according  to patient size, or iterative reconstruction technique.    COMPARISON: None.    FINDINGS:     Intracranial contents: There is diffuse parenchymal volume loss.   White matter changes are present in the cerebral hemispheres that are  consistent with small vessel ischemic disease in this age patient.  There is low-density in the right insular cortex region. This is best  seen on axial images 16 and 17. This is consistent with a cortical  infarct. The age of this is uncertain. No hemorrhage or mass effect.    Visualized orbits/sinuses/mastoids:  The visualized portions of the  sinuses and mastoids appear normal.    Osseous structures/soft tissues:  There is no evidence of trauma.      Impression    IMPRESSION:   1. No intracranial hemorrhage.  2. Low dense area in  the right insular cortex region. This is  consistent with an infarct. Age of this is uncertain. It could be  chronic.  3. There is diffuse parenchymal volume loss.  White matter changes are  present in the cerebral hemispheres that are consistent with small  vessel ischemic disease in this age patient.      CURTIS AVILA MD   CTA Head Neck with Contrast    Narrative    CT ANGIOGRAM OF THE HEAD AND NECK  8/3/2019 9:45 PM     HISTORY: Altered mental status. Code stroke    TECHNIQUE:  Precontrast localizing scans were followed by CT  angiography with an injection of 70 mL IV contrast with scans through  the head and neck.  Images were transferred to a separate 3-D  workstation where multiplanar reformations and 3-D images were  created.  Estimates of carotid stenoses are made relative to the  distal internal carotid artery diameters except as noted. Radiation  dose for this scan was reduced using automated exposure control,  adjustment of the mA and/or kV according to patient size, or iterative  reconstruction technique.    COMPARISON: None.    FINDINGS: Estimates of stenosis at the carotid bifurcations are  relative to the distal internal carotids.    Arch: Calcified atherosclerotic plaque is seen in the arch of the  aorta. Extensive calcified atherosclerotic plaque is seen at the  origin of the right subclavian artery.    Neck:  Right Carotid:  The right common carotid artery is normal.  Calcified  atherosclerotic plaque is seen at the right carotid bifurcation. The  degree of stenosis is less than 50%..  The right internal carotid  artery is negative.     Left Carotid:  The left common carotid artery is unremarkable.   Calcified atherosclerotic plaque is seen at the left carotid  bifurcation. There is a severe stenosis at the bifurcation.  The left  internal carotid is negative.      Vertebrals:  The right vertebral artery is occluded proximally 2 cm  distal to the origin. There is collateral filling of the distal,  V4  segment of the right vertebral artery. The left vertebral artery is a  large vessel and it supplies the basilar artery..    Head:  Right Carotid:No occluded vessels are seen. .    Left Carotid: There is a filling defect in the M1 segment of the left  middle cerebral artery consistent with thromboembolic disease. This is  causing a high-grade stenosis, near occlusion...  There is filling of  the inferior division of the left middle cerebral..    Basilar:  The basilar artery and its branches appear normal.     Miscellaneous: None      Impression    IMPRESSION:   1. Filling defect in the M1 segment of the left middle cerebral  leading to a high-grade stenosis. There is filling of inferior  division of the left middle cerebral.   2. High-grade stenosis at the left carotid bifurcation.  3. Mild stenosis right carotid bifurcation.  4. 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    Report was called to Dr. Gonzalez at 9:50 PM   Blood gas venous   Result Value Ref Range    Ph Venous 7.46 (H) 7.32 - 7.43 pH    PCO2 Venous 39 (L) 40 - 50 mm Hg    PO2 Venous 46 25 - 47 mm Hg    Bicarbonate Venous 28 21 - 28 mmol/L    Base Excess Venous 3.8 mmol/L    FIO2 2L NC    UA reflex to Microscopic and Culture   Result Value Ref Range    Color Urine Yellow     Appearance Urine Cloudy     Glucose Urine >499 (A) NEG^Negative mg/dL    Bilirubin Urine Negative NEG^Negative    Ketones Urine Negative NEG^Negative mg/dL    Specific Gravity Urine 1.026 1.003 - 1.035    Blood Urine Small (A) NEG^Negative    pH Urine 5.0 5.0 - 7.0 pH    Protein Albumin Urine 100 (A) NEG^Negative mg/dL    Urobilinogen mg/dL 0.0 0.0 - 2.0 mg/dL    Nitrite Urine Positive (A) NEG^Negative    Leukocyte Esterase Urine Large (A) NEG^Negative    Source Catheterized Urine     RBC Urine 12 (H) 0 - 2 /HPF    WBC Urine 2,635 (H) 0 - 5 /HPF    WBC Clumps Present (A) NEG^Negative /HPF    Squamous Epithelial  /HPF Urine 3 (H) 0 - 1 /HPF    Mucous Urine Present (A) NEG^Negative /LPF   XR Chest Port 1 View    Narrative    XR CHEST PORTABLE 1 VIEW   8/3/2019 10:45 PM     HISTORY: Post intubation.    COMPARISON: None.    FINDINGS: Supine portable chest. ET tube in place with tip  approximately 3 cm above the thomas. NG tube tip is near the  gastroesophageal junction. No pneumothorax. The heart is enlarged.  There is no pulmonary edema. Thoracic aorta is calcified. Mild left  basilar atelectasis or scarring. The lungs are otherwise clear.      Impression    IMPRESSION: ET tube 3 cm above the thomas. NG tube tip near the  gastroesophageal junction.     Neuro assessment completed. Pt. Has no purposeful movements upon arrival to IR. Propofol infusing.    Peripheral pulses checked and documented in Epic Flowsheet.     Patient prepped with 2% Chlorhexidine and draped in supine position.  VSS.  Monitor reads ST with rate 118.      MD first needle stick time was 0107.    A 8Fr arterial sheath was placed at 0107      Unable to assess neuro status during procedure due to general anesthesia.    An 8fr sheath was removed at 0145 an 8f angioseal was placed to Right femoral. The site is C/D/I at procedure end without. Previous hematoma is noted to Right groin site (procedure in the past 5 days for recent CVA).

## 2019-08-04 NOTE — PROGRESS NOTES
[facility] RESPIRATORY NOTE  Date of Admission: 8/4/19  Date of Intubation (most recent): 8/4/19  Reason for Mechanical Ventilation:   Number of Days on Mechanical Ventilation: 1  Met Criteria for Pressure Support Trial: No  Length of Pressure Support Trial:    Reason for Stopping Pressure Support Trial:   Reason for No Pressure Support Trial: Per MD     Significant Events Today: None overnight      ABG Results:   Recent Labs   Lab 08/03/19  2200   O2PER 2L NC     Ventilation Mode: SIMV  (Synchronized Intermittent Mandatory Ventilation)  Rate Set (breaths/minute): 12 breaths/min  Tidal Volume Set (mL): 450 mL  PEEP (cm H2O): 5 cmH2O  Pressure Support (cm H2O): 5 cmH2O  Oxygen Concentration (%): 35 %  Resp: 14         ETT appearance on chest x-ray: In good position      Skin Assessment: Intact     Plan:   Continue full support    Aquilino DURAN

## 2019-08-05 PROBLEM — I63.511 ACUTE ISCHEMIC RIGHT MIDDLE CEREBRAL ARTERY (MCA) STROKE (H): Status: ACTIVE | Noted: 2019-01-01

## 2019-08-05 PROBLEM — E11.9 DIABETES MELLITUS, TYPE II (H): Status: ACTIVE | Noted: 2019-01-01

## 2019-08-05 PROBLEM — I48.0 PAROXYSMAL ATRIAL FIBRILLATION (H): Status: ACTIVE | Noted: 2017-04-27

## 2019-08-05 PROBLEM — E03.9 HYPOTHYROIDISM: Status: ACTIVE | Noted: 2019-01-01

## 2019-08-05 PROBLEM — M54.9 BACK PAIN WITHOUT RADIATION: Status: ACTIVE | Noted: 2018-04-19

## 2019-08-05 PROBLEM — M31.6 GIANT CELL ARTERITIS (H): Status: ACTIVE | Noted: 2018-01-01

## 2019-08-05 NOTE — PLAN OF CARE
Neuro-  No Neuro changes.  SHAY 2-3  Left gaze   Out on RUE  Spontaneous movement on LUE.  Withdraws to BLE.    CV- A-fib -130.  Pt did receive additional doses of IV and PO metoprolol.    Resp-Pt did well with 3 hr wean trial.  L/s are clear.  GI/-  Urine output borderline low.  NPO.

## 2019-08-05 NOTE — PLAN OF CARE
Neuro: afebrile. drowsy, at times opens eyes slightly but not to command, left gaze preference, some flexion noted on left side, withdraws to pain LUE,LLE,RLE, flexion to pain RUE. PERRLA. Unable to assess complete neuro assessment as pt drowsy-obtunded and not following command, neuro checks every 2 hours per MD  CV: Afib with CVR, goal SBP <180 per neuro crit, pedal pulses palpable.  Pulm: tolerated 8 hours of PST 5/5, currently on SMIV 35%, minimal creamy yellow secretion.LS coarse.  GI/: TF started at 15ml/hr with 60cc water flush, active BS, no BM today, lindo with marginal UOP 50ml/2 hour, intensivist informed, no new orders.  Skin: bruises present back, flank.rt groin. Pale skin, blanchable coccyx with mepilex, scab JENS chest.some yeast near hood area, miconazole cream applied  Lines: PIVX3 right arm  Restraints: left wrist Continued to maintain patency of necessary lines and ET tube.   Plan: Rest overnight on vent., SBT tomorrow. Multiple families updated- grand children, spoke person is daughter. Continue to monitor

## 2019-08-05 NOTE — PROGRESS NOTES
Critical Care  Note      08/05/2019    Name: Nessa Chan MRN#: [id number]   Age: 84 year old YOB: 1935     [facility]    MV DAY #1             Problem List:   Acute ischemic stroke  Loss of protective airway reflexes         Summary/Hospital Course:     Pt intubated/sedated, unable to obtain histories directly. No events overnight.  Remains unresponsive off sedation.    84F presented to OSH after being found down and then becoming encephalopathic at her TCU.  Noted to have a leftward gaze and unresponsive.  CT scan at OSH revealed a R insular infarct, followup CTA revealed a left MCA M1 segment high grade stenosis as well as high grade stenosis at the L bifurcation.  Intubated there for airway protection, then tpa given and transferred here where penumbra mechanical intervention was performed.  Has remained intubated with depressed consciousness.       Assessment and plan :     Nessa Chan IS a 84 year old female admitted on 8/4/2019 for acute cva.   I have personally reviewed the daily labs, imaging studies, cultures and discussed the case with referring physician and consulting physicians.     My assessment and plan by system for this patient is as follows:    Neurology/Psychiatry:   1. Acute cva:  Appreciate neuro and IR efforts.  S/p aspiration with penumbra device.  Neuro checks and followup imaging per neuro direction.  ASA and plavix per neuro direction, but presumably holding now due to risk for hemorrhagic conversion in setting of large stroke.  2.  Sedation:  Holding for now to best assess mental status.  Propofol/prn midazolam if needed.  3.  Analgesia:  Prn dilaudid    Cardiovascular:   1. HLD:  Home zetia   2.  HTN:  Nicardipine drip per neuro with sbp goal 140, presumably to prevent hemorrhagic conversion.    3.  Paroxysmal AF: Rate control as needed with metoprolol, re-increased to home metoprolol 75 bid.  Hold AC for now in setting of large acute cva and  tpa.    Pulmonary/Ventilator Management:   1. Loss of airway protective reflexes:  In setting of cva noted above.  Intubated/ventilated.    GI and Nutrition :   1.  Initiate nutrition today.  2.  PPI for pud prophy    Renal/Fluids/Electrolytes:   1. Creat 0.91 improving; appears to be baseline when compared with recents in care everywhere.    Infectious Disease:   1. Appears to have UTI on UA.  Ceftriaxone for now pending cultures.     Endocrine:   1. Hypothyroid:  Continue home levothyroxine  2.  Glucose control as needed.    Hematology/Oncology:   1. Wbc elevated 16.4--monitoring; awaiting urine culture; may be reactive to large cva.  2.  Hbg ok 10.3  3.  plttls ok 300    ICU Prophylaxis:   1. DVT: hold due to risk of hemorrhagic conversion  2. VAP: HOB 30 degrees, chlorhexidine rinse  3. Stress Ulcer: PPI  4. Restraints: Nonviolent soft two point restraints required and necessary for patient safety and continued cares and good effect as patient continues to pull at necessary lines, tubes despite education and distraction. Will readdress daily.   5. Wound care  -   6. Feeding - initiate today  7. Family Update: pending neuro assessment  8. Disposition - icu           Medical History:     PMH ([facility] dischg summary 7/25):  Temporal arteritis  Carotid stenosis bilateral  Depression  Diabetes  Prior GIB while on warfarin  Glaucoma  HLD  HTN  hypoNa  Hypothyroid  Paroxysmal AF  Osteoporosis  ckd    PSH ([facility] 7/25):  Temporal artery biopsy    Home Meds (per [facility] discharge summary 7/25):    Tylenol  Amlodipine 10 daily  zetia 10 daily  Fenofibrate 160 daily  Folate 1 mg daily  Levothyroxine 75 daily  Metoprolol 75 bid  prilosec 20 daily  zoloft 100 daily  Timolol optic 1 drop both eyes bid  b12 1000 mcg daily  ?Eliquis?       Allergies   Allergen Reactions     Ciprofloxacin      Other reaction(s): Dizziness     Hmg-Coa-R Inhibitors      Myalgias. Zocor, Crestor, Mevacor, Lipitor.         Social hx:  Unable, intubated/sedated.  Fam hs:  Unable, intubated/sedated  ROS: unable, intubated/sedated.           Physical Examination:   Temp:  [97.3  F (36.3  C)-98.6  F (37  C)] 98.6  F (37  C)  Pulse:  [] 118  Heart Rate:  [] 128  Resp:  [11-26] 21  BP: ()/() 115/63  FiO2 (%):  [35 %] 35 %  SpO2:  [95 %-100 %] 99 %    Intake/Output Summary (Last 24 hours) at 8/4/2019 0152  Last data filed at 8/4/2019 0148  Gross per 24 hour   Intake 1000 ml   Output --   Net 1000 ml     Wt Readings from Last 4 Encounters:   08/05/19 75.1 kg (165 lb 9.1 oz)   08/03/19 70.3 kg (155 lb)     BP - Mean:  [] 79  Ventilation Mode: CPAP/PS  (Continuous positive airway pressure with Pressure Support)  FiO2 (%): 35 %  Rate Set (breaths/minute): 12 breaths/min  Tidal Volume Set (mL): 450 mL  PEEP (cm H2O): 5 cmH2O  Pressure Support (cm H2O): 5 cmH2O  Oxygen Concentration (%): 35 %  Resp: 21    Recent Labs   Lab 08/04/19  1117 08/04/19  0502 08/03/19  2200   O2PER 35% 35 percent  2L NC       GEN: no acute distress, intubated/sedated   HEENT: head ncat, sclera anicteric, OP patent, trachea midline   PULM: unlabored synchronous with vent, clear anteriorly    CV/COR: RRR S1S2 no gallop,  No rub, no murmur  ABD: soft nontender, hypoactive bowel sounds, no mass  EXT:   Warm x4, no edema  NEURO: sedated, perrl, strong triple flex on L lower, increased tone to L upper/lower.    SKIN: no obvious rash  LINES: clean, dry intact         Data:   All data and imaging reviewed     ROUTINE ICU LABS (Last four results)  CMP  Recent Labs   Lab 08/05/19  0538 08/04/19  0502 08/03/19  2123   * 143 141   POTASSIUM 3.5 3.5 3.4   CHLORIDE 117* 112* 109   CO2 24 25 25   ANIONGAP 9 6 7   * 240* 236*   BUN 19 20 26   CR 0.91 1.00 1.06*   GFRESTIMATED 58* 52* 48*   GFRESTBLACK 67 60* 56*   KADEEM 8.6 8.2* 9.2   MAG 2.2 1.7  --    PHOS 1.9* 4.3  --    PROTTOTAL  --   --  6.8   ALBUMIN  --   --  3.1*   BILITOTAL  --    --  0.5   ALKPHOS  --   --  60   AST  --   --  30   ALT  --   --  18     CBC  Recent Labs   Lab 08/05/19  0538 08/04/19  0502 08/03/19 2123   WBC 16.4* 12.9* 10.1   RBC 3.65* 3.65* 4.27   HGB 10.3* 10.5* 11.9   HCT 33.4* 33.4* 39.5   MCV 92 92 93   MCH 28.2 28.8 27.9   MCHC 30.8* 31.4* 30.1*   RDW 16.6* 16.2* 16.0*    250 319     INR  Recent Labs   Lab 08/03/19  2123   INR 1.09     Arterial Blood Gas  Recent Labs   Lab 08/04/19  1117 08/04/19  0502 08/03/19  2200   O2PER 35% 35 percent  2L NC       All cultures:  Recent Labs   Lab 08/03/19  2204   CULT PENDING     Recent Results (from the past 24 hour(s))   CT Head w/o Contrast    Narrative    CT SCAN OF THE HEAD WITHOUT CONTRAST   8/3/2019 9:35 PM     HISTORY: Altered mental status. (LOC), unexplained    TECHNIQUE:  Axial images of the head and coronal reformations without  IV contrast material. Radiation dose for this scan was reduced using  automated exposure control, adjustment of the mA and/or kV according  to patient size, or iterative reconstruction technique.    COMPARISON: None.    FINDINGS:     Intracranial contents: There is diffuse parenchymal volume loss.   White matter changes are present in the cerebral hemispheres that are  consistent with small vessel ischemic disease in this age patient.  There is low-density in the right insular cortex region. This is best  seen on axial images 16 and 17. This is consistent with a cortical  infarct. The age of this is uncertain. No hemorrhage or mass effect.    Visualized orbits/sinuses/mastoids:  The visualized portions of the  sinuses and mastoids appear normal.    Osseous structures/soft tissues:  There is no evidence of trauma.      Impression    IMPRESSION:   1. No intracranial hemorrhage.  2. Low dense area in the right insular cortex region. This is  consistent with an infarct. Age of this is uncertain. It could be  chronic.  3. There is diffuse parenchymal volume loss.  White matter changes  are  present in the cerebral hemispheres that are consistent with small  vessel ischemic disease in this age patient.      CURTIS AVILA MD   CTA Head Neck with Contrast    Narrative    CT ANGIOGRAM OF THE HEAD AND NECK  8/3/2019 9:45 PM     HISTORY: Altered mental status. Code stroke    TECHNIQUE:  Precontrast localizing scans were followed by CT  angiography with an injection of 70 mL IV contrast with scans through  the head and neck.  Images were transferred to a separate 3-D  workstation where multiplanar reformations and 3-D images were  created.  Estimates of carotid stenoses are made relative to the  distal internal carotid artery diameters except as noted. Radiation  dose for this scan was reduced using automated exposure control,  adjustment of the mA and/or kV according to patient size, or iterative  reconstruction technique.    COMPARISON: None.    FINDINGS: Estimates of stenosis at the carotid bifurcations are  relative to the distal internal carotids.    Arch: Calcified atherosclerotic plaque is seen in the arch of the  aorta. Extensive calcified atherosclerotic plaque is seen at the  origin of the right subclavian artery.    Neck:  Right Carotid:  The right common carotid artery is normal.  Calcified  atherosclerotic plaque is seen at the right carotid bifurcation. The  degree of stenosis is less than 50%..  The right internal carotid  artery is negative.     Left Carotid:  The left common carotid artery is unremarkable.   Calcified atherosclerotic plaque is seen at the left carotid  bifurcation. There is a severe stenosis at the bifurcation.  The left  internal carotid is negative.      Vertebrals:  The right vertebral artery is occluded proximally 2 cm  distal to the origin. There is collateral filling of the distal, V4  segment of the right vertebral artery. The left vertebral artery is a  large vessel and it supplies the basilar artery..    Head:  Right Carotid:No occluded vessels are seen. .    Left  Carotid: There is a filling defect in the M1 segment of the left  middle cerebral artery consistent with thromboembolic disease. This is  causing a high-grade stenosis, near occlusion...  There is filling of  the inferior division of the left middle cerebral..    Basilar:  The basilar artery and its branches appear normal.     Miscellaneous: None      Impression    IMPRESSION:   1. Filling defect in the M1 segment of the left middle cerebral  leading to a high-grade stenosis. There is filling of inferior  division of the left middle cerebral.   2. High-grade stenosis at the left carotid bifurcation.  3. Mild stenosis right carotid bifurcation.  4. 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    Report was called to Dr. Gonzalez at 9:50 PM   XR Chest Port 1 View    Narrative    XR CHEST PORTABLE 1 VIEW   8/3/2019 10:45 PM     HISTORY: Post intubation.    COMPARISON: None.    FINDINGS: Supine portable chest. ET tube in place with tip  approximately 3 cm above the thomas. NG tube tip is near the  gastroesophageal junction. No pneumothorax. The heart is enlarged.  There is no pulmonary edema. Thoracic aorta is calcified. Mild left  basilar atelectasis or scarring. The lungs are otherwise clear.      Impression    IMPRESSION: ET tube 3 cm above the thomas. NG tube tip near the  gastroesophageal junction.     Labs (personally reviewed/interpreted):  See a/p.  MRI brain:  B/l frontal strokes, strokes to L parietal lobe and L basal ganglia.    D/w Dr. Castano of neurology.    Billing: This patient is critically ill: Yes. Total critical care time today 45 min.

## 2019-08-05 NOTE — PROGRESS NOTES
[facility] ICU RESPIRATORY NOTE    Date of Admission: 8/4/19  Date of Intubation (most recent): 8/4/19  Reason for Mechanical Ventilation:   Number of Days on Mechanical Ventilation: 2  Met Criteria for Pressure Support Trial: yes  Met Criteria for Pressure Support Trial: Yes  Length of Pressure Support Trial: PS 5/5 35% for 8hrs and 30mins   Reason for Stopping Pressure Support Trial: To rest the pt for overnight.    ABG Results:   Recent Labs   Lab 08/04/19  1117 08/04/19  0502 08/03/19  2200   O2PER 35% 35 percent  2L NC       Current Vent Settings: Ventilation Mode: SIMV  (Synchronized Intermittent Mandatory Ventilation)  FiO2 (%): 35 %  Rate Set (breaths/minute): 12 breaths/min  Tidal Volume Set (mL): 450 mL  PEEP (cm H2O): 5 cmH2O  Pressure Support (cm H2O): 5 cmH2O  Oxygen Concentration (%): 35 %  Resp: 20      Skin Assessment: intact    Plan: Continue full ventilator support.    Krissy Terry  8/5/2019

## 2019-08-05 NOTE — PHARMACY
Pharmacy Tube Feeding Consult    Medication reviewed for administration by feeding tube and for potential food/drug interactions.    Recommendation: Recommend changing the following medications to a liquid dosage form: protonix (done per scope of practice)      Pharmacy will continue to follow as new medications are ordered.    Shelia Fragoso, PharmD

## 2019-08-05 NOTE — PROGRESS NOTES
[facility] ICU RESPIRATORY NOTE  Date of Admission: 8/4/19  Date of Intubation (most recent): 8/4/19  Reason for Mechanical Ventilation:   Number of Days on Mechanical Ventilation: 1  Met Criteria for Pressure Support Trial: No    Reason for No Pressure Support Trial: [facility] ICU RESPIRATORY NOTE  Date of Admission: 8/4/19  Date of Intubation (most recent): 8/4/19  Reason for Mechanical Ventilation:   Number of Days on Mechanical Ventilation: 1  Met Criteria for Pressure Support Trial: No  Reason for No Pressure Support Trial: Per MD     Significant Events Today: None during the day shift.     ABG Results:         Recent Labs   Lab 08/04/19  1117 08/04/19  0502 08/03/19  2200   O2PER 35% 35 percent  2L NC      Ventilation Mode: SIMV/PS  (Synchronized Intermittent Mandatory Ventilation with Pressure Support)  FiO2 (%): 35 %  Rate Set (breaths/minute): 12 breaths/min  Tidal Volume Set (mL): 450 mL  PEEP (cm H2O): 5 cmH2O  Pressure Support (cm H2O): 5 cmH2O  Oxygen Concentration (%): 35 %  Resp: 24       ABG Results:         Recent Labs   Lab 08/04/19  1117 08/04/19  0502 08/03/19  2200   O2PER 35% 35 percent  2L NC      Ventilation Mode: SIMV/PS  (Synchronized Intermittent Mandatory Ventilation with Pressure Support)  FiO2 (%): 35 %  Rate Set (breaths/minute): 12 breaths/min  Tidal Volume Set (mL): 450 mL  PEEP (cm H2O): 5 cmH2O  Pressure Support (cm H2O): 5 cmH2O  Oxygen Concentration (%): 35 %  Resp: 24    Plan: Pt remains on full ventilator support.     Mike Kohler RT.

## 2019-08-05 NOTE — CONSULTS
"CLINICAL NUTRITION SERVICES  -  ASSESSMENT NOTE      Recommendations Ordered by Registered Dietitian (RD):   Begin TF Isosource 1.5 at 15 mL/hr;  Increase by 15 mL every 24 hrs to goal 45 mL/hr = 1620 kcals (27 kcal/kg), 73 gm pro (1.2 gm/kg), 820 mL H20, 16 gm fiber, 190 gm CHO  Free H20 60 mL every 4 hrs   Malnutrition:   % Weight Loss:  Up to 5% in 1 month (non-severe malnutrition)  % Intake:  </= 50% for >/= 5 days (severe malnutrition) - Suspect  Subcutaneous Fat Loss:  None observed  Muscle Loss:  Temporal region mild depletion  Fluid Retention:  None noted    Malnutrition Diagnosis: Non-Severe malnutrition  In Context of:  Acute illness or injury        REASON FOR ASSESSMENT  Nessa Chan is a 84 year old female seen by Registered Dietitian for Provider Order - Registered Dietitian to Assess and Order TF per Medical Nutrition Therapy Protocol      NUTRITION HISTORY  - Unable to obtain detailed nutrition history as pt intubated and grandson not sure about details.  Information obtained from Epic records and paper chart.  Pt was recently residing at [facility] and was on a DD1 with nectar thick liquids diet (ordered on 8/2).  Per grandson, she normally is able to shop and cook at home, eats regular meals, and checks her blood sugars regularly.  During May, 2019 office visit, MD indicated \"generalized weakness and anorexia.\"  No food allergies/intolerances.  She was in the hospital recently and then transferred to [facility].  I suspect she's lost some ground nutritionally as of late (meals held due to procedures and dysphagia).      CURRENT NUTRITION ORDERS  Diet Order:     NPO     Current Intake/Tolerance:  NPO x 3 days.    8/4:  Transferred from [facility] intubated  8/4:  tPA  8/4:  AXR = Tip and sidehole of the nasogastric tube are below the left hemidiaphragm in the stomach    NUTRITION FOCUSED PHYSICAL ASSESSMENT FOR DIAGNOSING MALNUTRITION)  Completed:  Yes Partial assessment          Observed:    Muscle " "wasting (refer to documentation in Malnutrition section)    Obtained from Chart/Interdisciplinary Team:  None noted    ANTHROPOMETRICS  Height: 5' 4.016\"  Admit Weight:  72.7 kg  Body mass index is 27.9 kg/m .  Weight Status:  Overweight BMI 25-29.9  IBW:  54.5 kg  % IBW:  135%  Weight History:  Weight loss 3.1 kg (4%) over past month    Per Care Everywhere -   5/29/19  76.8 kg  7/26/19  78.7 kg    Wt Readings from Last 20 Encounters:   08/05/19 75.1 kg (165 lb 9.1 oz)   08/03/19 70.3 kg (155 lb)       LABS  Labs reviewed  Na 150 (H)  K 3.5 (NL)  Mg 2.2 (NL)  Phos 1.9 (L) --> replaced  Hgb A1C 7.1 (H) - Pt with h/o DM    MEDICATIONS  Medications reviewed  Folic acid - for supplementation  HSSI - for glycemic control  Propofol has been off x 12 hrs per RN      ASSESSED NUTRITION NEEDS PER APPROVED PRACTICE GUIDELINES:    Dosing Weight:   59.3 kg (adjusted for overwt)  Estimated Energy Needs:  0186-7923 kcals (25-30 Kcal/Kg)  Justification: overweight  Estimated Protein Needs:  70-90 grams protein (1.2-1.5 g pro/Kg)  Justification: Repletion, hypercatabolism with critical illness and CKD  Estimated Fluid Needs:  1345-0511  mL (1 mL/Kcal)  Justification: maintenance    MALNUTRITION:  % Weight Loss:  Up to 5% in 1 month (non-severe malnutrition)  % Intake:  </= 50% for >/= 5 days (severe malnutrition) - Suspect  Subcutaneous Fat Loss:  None observed  Muscle Loss:  Temporal region mild depletion  Fluid Retention:  None noted    Malnutrition Diagnosis: Non-Severe malnutrition  In Context of:  Acute illness or injury    NUTRITION DIAGNOSIS:  Inadequate protein-energy intake related to intubation as evidenced by NPO status, meeting 0% needs, and TF planned      NUTRITION INTERVENTIONS  Recommendations / Nutrition Prescription  Begin TF Isosource 1.5 at 15 mL/hr;  Increase by 15 mL every 24 hrs to goal 45 mL/hr = 1620 kcals (27 kcal/kg), 73 gm pro (1.2 gm/kg), 820 mL H20, 16 gm fiber, 190 gm CHO  Free H20 60 mL every 4 " hrs      Implementation  Nutrition education: Not appropriate at this time due to patient condition   Collaboration and Referral of Nutrition care - Discussed pt during ICU interdisciplinary rounds this morning and later with bedside RN.  EN Composition, EN Schedule, Feeding Tube Flush - Entered orders as above    Nutrition Goals  TF goal Isosource 1.5 at 45 mL/hr will meet % estimated needs while NPO status      MONITORING AND EVALUATION:  Progress towards goals will be monitored and evaluated per protocol and Practice Guidelines      Jill oLuis, RD, LD, CNSC

## 2019-08-05 NOTE — PROGRESS NOTES
[facility] ICU RESPIRATORY NOTE  Date of Admission: 8/4/19  Date of Intubation (most recent): 8/4/19  Reason for Mechanical Ventilation:   Number of Days on Mechanical Ventilation: 2  Met Criteria for Pressure Support Trial: yes  Length of Pressure Support Trial: 2 hrs   Reason for Stopping Pressure Support Trial: tachycardia  Reason for No Pressure Support Trial:      Significant Events Today: None overnight      ABG Results:   Recent Labs   Lab 08/04/19  1117 08/04/19  0502 08/03/19  2200   O2PER 35% 35 percent  2L NC     Ventilation Mode: SIMV/PS  (Synchronized Intermittent Mandatory Ventilation with Pressure Support)  FiO2 (%): 35 %  Rate Set (breaths/minute): 12 breaths/min  Tidal Volume Set (mL): 450 mL  PEEP (cm H2O): 5 cmH2O  Pressure Support (cm H2O): 5 cmH2O  Oxygen Concentration (%): 35 %  Resp: 24    Will continue to follow.     Aquilino Jose RT

## 2019-08-06 NOTE — PROGRESS NOTES
[facility] ICU RESPIRATORY NOTE        Date of Admission: 8/4/2019    Date of Intubation (most recent): 8/4/19    Reason for Mechanical Ventilation: Airway protection    Number of Days on Mechanical Ventilation: 3    Met Criteria for Pressure Support Trial: No    Reason for No Pressure Support Trial: per MD    Significant Events Today: none    ABG Results:   Recent Labs   Lab 08/04/19  1117 08/04/19  0502 08/03/19  2200   O2PER 35% 35 percent  2L NC       Current Vent Settings: Ventilation Mode: SIMV  (Synchronized Intermittent Mandatory Ventilation)  FiO2 (%): 35 %  Rate Set (breaths/minute): 12 breaths/min  Tidal Volume Set (mL): 450 mL  PEEP (cm H2O): 5 cmH2O  Pressure Support (cm H2O): 5 cmH2O  Oxygen Concentration (%): 35 %  Resp: 23          Plan: continue ventilatory support SBT's daily    Joe Cuevas

## 2019-08-06 NOTE — PROGRESS NOTES
Stroke Service Progress Note  Remains intubated; not awake despite being off sedation.    08/05/2019    ON events:   - off sedation    Problem List:  Patient Active Problem List   Diagnosis     Acute CVA (cerebrovascular accident) (H)     Benign essential hypertension     Carotid stenosis     Chronic kidney disease (CKD) stage G3a/A2, moderately decreased glomerular filtration rate (GFR) between 45-59 mL/min/1.73 square meter and albuminuria creatinine ratio between  mg/g (H)     Diabetes mellitus, type II (H)     Acute ischemic right middle cerebral artery (MCA) stroke (H)     Giant cell arteritis (H)     GERD (gastroesophageal reflux disease)     Hypothyroidism     Mixed hyperlipidemia     Paroxysmal atrial fibrillation (H)     Back pain without radiation       Current Medications:    cefTRIAXone  1 g Intravenous Q24H     chlorhexidine  15 mL Mouth/Throat Q12H     ezetimibe  10 mg Oral Daily     folic acid  1 mg Oral Daily     levothyroxine  75 mcg Oral QAM AC     metoprolol  75 mg Oral BID     [START ON 8/6/2019] pantoprazole  40 mg Oral QAM AC     timolol maleate  1 drop Both Eyes BID       Infusions:    sodium chloride 0.9 %       IV fluid REPLACEMENT ONLY       IV fluid REPLACEMENT ONLY       insulin (regular) 0.5 Units/hr (08/05/19 2000)     niCARdipine 40 mg in 200 mL 0.9% NaCl Stopped (08/05/19 0949)     propofol (DIPRIVAN) infusion Stopped (08/04/19 0945)     sodium chloride 10 mL/hr at 08/05/19 1812       Intake/Output:    Intake/Output Summary (Last 24 hours) at 8/5/2019 2042  Last data filed at 8/5/2019 1812  Gross per 24 hour   Intake 2414.83 ml   Output 725 ml   Net 1689.83 ml       Physical Examination:   Vitals:  B/P: 131/84, T: 98.4, P: 117, R: 22  Exam:  Neurologic: On exam, she is intubated and sedated.  She does not open her eyes to verbal, tactile, or noxious stimulus.  No blink to threat from the right.  Left gaze preference but able to hold on with.  Pupils are symmetric, 3 mm and react  "to light.  Facial symmetry difficult to assess due to intubation.  No response to stimuli in the right upper extremity, triple flexion in the right lower extremity, appropriate withdrawal to noxious stimulus in the left arm and leg.  Rare spontaneous movements observed on the left limbs.  Left limbs hyperreflexic with clonus in the left arm.  Plantar response upgoing bilaterally.  Irregular cardiac rhythm.  Mechanically ventilated.  Moderate edema in all limbs.      NIHSS  Exam Interval: day 2   Score    Level of consciousness: (3)   Responds w/ reflex motor/autonomic effect or no response    LOC questions: (2)   Answers neither question correctly    LOC commands: (2)   Performs neither task correctly    Best gaze: (1)   Partial gaze palsy    Visual: (2)   Complete hemianopia    Facial palsy: (0)   Normal symmetrical movements    Motor arm (left): (2)   Some effort against gravity    Motor arm (right): (4)   No movement    Motor leg (left): (2)   Some effort against gravity    Motor leg (right): (3)   No effort against gravity    Limb ataxia: (0)   Absent    Sensory: (0)   Normal- no sensory loss    Best language: (3)   Mute- global aphasia    Dysarthria: UN Intubated or other physical barrier: Intubated    Extinction and inattention: (0)   No abnormality        Total Score:  24       Labs/Studies: Imaging results copied directly to ensure accuracy  Lab Results   Component Value Date    A1C 7.1 08/04/2019     LDL checked during recent admission 58 on 7/26    MRI:   \"IMPRESSION:  1.  Multiple areas of restricted diffusion in the right and left  frontal lobes, the left basal ganglia, and in the cortex of the left  parietal lobe. These are consistent with evolving infarcts.   2. There are susceptibility hypointensities in both the right and left  frontal lobes and left basal ganglia. This is likely due to petechial  hemorrhage. This corresponds to the areas of high density seen on the  previous head CT.  3. Mild mass " "effect on the left lateral ventricle, but no significant  shift of midline structures.\"    CTH follow-up:  \"CONCLUSION:  1.  Broad zone of hyperdensity in the right MCA territory, presumably contrast blush within a large evolving ischemic infarct. Some degree of petechial hemorrhage would be difficult to exclude and follow-up to resolution is recommended.  2.  No mass effect, midline shift or hydrocephalus.\"    ASSESSMENT/PLAN:   Ms Chan is an 84-year-old woman with history of atrial fibrillation (previously on apixaban) currently admitted to the ICU for treatment of now bilateral MCA territory strokes.  She had a right MCA territory stroke on 7/25 and was treated with thrombectomy within the [facility], [place].  She was discharged to rehab last week.  Unfortunately, over the weekend she had a left MCA territory stroke and was ultimately treated with alteplase and thrombectomy.  She has been off sedation for several hours and has failed to wake up, likely secondary to having bilateral strokes.  Fortunately, the stroke volume involving the left side of her brain is small, but involves a significant portion of the basal ganglia which is likely contributing to her current mental status.  It is too early to predict the extent of recovery she may have.  We suggest waiting until at least Wednesday 8/7 before attempting prognostication.  Discussion with family members regarding goals of care are ongoing.    #Bilateral MCA territory infarctions, status post thrombectomy x2 and alteplase x1  - Systolic blood pressure goal less than 180  - Neurochecks every 2 hours  - Transthoracic echo with bubble study ordered  - Lipids and A1c checked during last admission  - Holding anticoagulation for now in setting of large stroke burden  - Potentially start aspirin tomorrow pending CT results  - Please avoid use of hypotonic solutions, such as D5 or half-normal saline, as they worsen cerebral edema  - She will likely need feeding " "tube for hydration and nourishment given significant stroke burden and resultant encephalopathy  - Head of bed elevated 30 degrees  - CT head in morning to assess stability of petechial hemorrhage  - Ongoing discussion with family members regarding goals of care  - goal normonatremia, but will permit hypernatremia  -Telemetry  -Stroke education  -PT/OT/speech therapy when appropriate    =========================================================    This patient was seen & discussed with attending neurologist, Dr. Rivero.     Mario Castano,   PGY5 Neurocritical Care Fellow    08/05/2019 8:42 PM  Text Page (8am-7pm)  To page stroke neurology after hours or on a subsequent day, click here: [facility]COM  Choose \"On Call\" tab at top, then search dropdown box for \"Neurology Adult\" & press Enter, look for Neuro ICU/Stroke          "

## 2019-08-06 NOTE — PLAN OF CARE
Patient does not respond or follow commands but does open eyes spontaneously for brief moments. Does not withdraw from pain in RUE, no movement of any kind noted on RUE. Left arm may be localizing pain to some degree. Small blanchable redness on coccyx, mepilex in place. South in place with adequate output. Tolerating vent, ett 22 at lip. NG landmark unchanged, feeding increased to 30 ml/hr and free water increased to 90 ml q4 due to hypernatremia. MD reported some hypernatremia is acceptable and to strictly avoid hypotonic fluids in all forms. Left wrist restrained. Continues to ave a-fib with RVR. Family care conference tomorrow at 11:30am. Family is aware.

## 2019-08-06 NOTE — PROGRESS NOTES
[facility] ICU RESPIRATORY NOTE            Date of Admission: 8/4/19  Date of Intubation (most recent): 8/4/19  Reason for Mechanical Ventilation:   Number of Days on Mechanical Ventilation: 3  Met Criteria for Pressure Support Trial: yes  Met Criteria for Pressure Support Trial: Yes  Length of Pressure Support Trial: PS 5/5 35% for about 1hr and 40 mins   Reason for Stopping Pressure Support Trial: To rest the pt for overnight.      ABG Results:   Recent Labs   Lab 08/04/19  1117 08/04/19  0502 08/03/19  2200   O2PER 35% 35 percent  2L NC       Current Vent Settings: Ventilation Mode: SIMV  (Synchronized Intermittent Mandatory Ventilation)  FiO2 (%): 35 %  Rate Set (breaths/minute): 12 breaths/min  Tidal Volume Set (mL): 450 mL  PEEP (cm H2O): 5 cmH2O  Pressure Support (cm H2O): 5 cmH2O  Oxygen Concentration (%): 35 %  Resp: 25      Skin Assessment: intact.    Plan: Continue current vent settings and assess in the morning for daily weaning trial.    Krissy Terry  8/6/2019

## 2019-08-06 NOTE — PROGRESS NOTES
"Stroke Service Progress Note  Intubated. Not sedated. No attempts to interact with environment    08/06/2019    ON events:   - opening eyes spontaneously  - purposeful movements with left arm observed  - multiple family members present    Problem List:  Patient Active Problem List   Diagnosis     Acute CVA (cerebrovascular accident) (H)     Benign essential hypertension     Carotid stenosis     Chronic kidney disease (CKD) stage G3a/A2, moderately decreased glomerular filtration rate (GFR) between 45-59 mL/min/1.73 square meter and albuminuria creatinine ratio between  mg/g (H)     Diabetes mellitus, type II (H)     Acute ischemic right middle cerebral artery (MCA) stroke (H)     Giant cell arteritis (H)     GERD (gastroesophageal reflux disease)     Hypothyroidism     Mixed hyperlipidemia     Paroxysmal atrial fibrillation (H)     Back pain without radiation       Current Medications:    aspirin  81 mg Oral Daily     chlorhexidine  15 mL Mouth/Throat Q12H     ezetimibe  10 mg Oral Daily     folic acid  1 mg Oral Daily     levothyroxine  75 mcg Oral QAM AC     metoprolol  75 mg Oral BID     pantoprazole  40 mg Oral QAM AC     piperacillin-tazobactam  3.375 g Intravenous Q6H     timolol maleate  1 drop Both Eyes BID       Infusions:    sodium chloride 0.9 %       IV fluid REPLACEMENT ONLY       IV fluid REPLACEMENT ONLY       insulin (regular) 3 Units/hr (08/06/19 0851)     niCARdipine 40 mg in 200 mL 0.9% NaCl Stopped (08/05/19 0949)     propofol (DIPRIVAN) infusion Stopped (08/04/19 0945)     sodium chloride 10 mL/hr at 08/05/19 1812     Physical Examination:   BP (!) 168/92   Pulse 115   Temp 100  F (37.8  C)   Resp 27   Ht 1.626 m (5' 4.02\")   Wt 76.9 kg (169 lb 8.5 oz)   SpO2 98%   BMI 29.09 kg/m      Exam:  Neurologic: On exam, she is intubated and does not make attempts to interact with examiners.  Eyes open spontaneously  No blink to threat from the right.  Left gaze preference but able to cross " "midline with oculocephalic maneuver.  Pupils are symmetric, 2mm and react to light. No clear facial asymmetry.  No response to stimuli in the right upper extremity or spontaneous movement, triple flexion in the right lower extremity, appropriate withdrawal to noxious stimulus in the left leg; localization in the left arm.  Left limbs hyperreflexic with clonus in the left arm. Reflexes brisk to lesser extent in the right leg. No reflex in RUE.  Plantar response upgoing bilaterally.  Irregular cardiac rhythm.  Mechanically ventilated.  Moderate edema in all limbs, worse in RUE.    [employer] Stroke Scale  Exam Interval: Baseline  Day 3   Score    Level of consciousness: (3)   Responds w/ reflex motor/autonomic effect or no response    LOC questions: (2)   Answers neither question correctly    LOC commands: (2)   Performs neither task correctly    Best gaze: (1)   Partial gaze palsy    Visual: (2)   Complete hemianopia    Facial palsy: (0)   Normal symmetrical movements    Motor arm (left): (2)   Some effort against gravity    Motor arm (right): (4)   No movement    Motor leg (left): (2)   Some effort against gravity    Motor leg (right): (3)   No effort against gravity    Limb ataxia: (0)   Absent    Sensory: (0)   Normal- no sensory loss    Best language: (3)   Mute- global aphasia    Dysarthria: UN Intubated or other physical barrier: intubated    Extinction and inattention: (0)   No abnormality        Total Score:  24       Labs/Studies: Imaging results copied directly to ensure accuracy  Lab Results   Component Value Date    A1C 7.1 08/04/2019     LDL checked during recent admission 58 on 7/26    MRI:   \"IMPRESSION:  1.  Multiple areas of restricted diffusion in the right and left  frontal lobes, the left basal ganglia, and in the cortex of the left  parietal lobe. These are consistent with evolving infarcts.   2. There are susceptibility hypointensities in both the right and left  frontal lobes " "and left basal ganglia. This is likely due to petechial  hemorrhage. This corresponds to the areas of high density seen on the  previous head CT.  3. Mild mass effect on the left lateral ventricle, but no significant  shift of midline structures.\"    CTH follow-up:  \"IMPRESSION: Evolving bilateral middle cerebral artery ischemic  infarcts. No evidence for any hemorrhagic transformation or  significant mass effect.\"    Echo:  \"Interpretation Summary     A contrast injection (Bubble Study) was performed that was negative for flow  across the interatrial septum.  The visual ejection fraction is estimated at 50-55%.  Left ventricular systolic function is low normal.  There is mild to moderate (1-2+) mitral regurgitation.  There is mild to moderate (1-2+) tricuspid regurgitation.  Right ventricular systolic pressure is elevated, consistent with mild to  moderate pulmonary hypertension.  The rhythm was rapid atrial fibrillation.  The study was technically difficult. The study was technically limited.\"    ASSESSMENT/PLAN:   Ms Chan is an 84-year-old woman with history of atrial fibrillation (previously on apixaban) currently admitted to the ICU for treatment of now bilateral MCA territory strokes. Minimal improvement in exam and wakefulness. Plan for family meeting tomorrow to discuss goals of care and prognosis. Left MCA stroke volume is low, but areas of brain involved are causing significant debility. Reduced level of alertness likely related to bilateral nature of strokes.    #Bilateral MCA territory infarctions, status post thrombectomy x2 and alteplase x1  - Systolic blood pressure goal less than 180  - Neurochecks every 2 hours  - Holding anticoagulation for now in setting of large stroke burden  - aspirin 81mg daily started today  - Please avoid use of hypotonic solutions, such as D5 or half-normal saline, as they worsen cerebral edema  - start modafinil 200mg daily in attempt to stimulate wakefulness   - She " "will likely need feeding tube for hydration and nourishment given significant stroke burden and resultant encephalopathy  - Head of bed elevated 30 degrees  - goal normonatremia, but will permit hypernatremia  -Telemetry  -Stroke education  -PT/OT/speech therapy when appropriate  - family meeting tomorrow    =========================================================    This patient was seen & discussed with attending neurologist, Dr. Rivero.     Mario Castano,   PGY5 Neurocritical Care Fellow    08/06/2019 9:04 AM  Text Page (8am-7pm)  To page stroke neurology after hours or on a subsequent day, click here: AMCOM  Choose \"On Call\" tab at top, then search dropdown box for \"Neurology Adult\" & press Enter, look for Neuro ICU/Stroke          "

## 2019-08-06 NOTE — PROGRESS NOTES
Pt opens eyes spontaneously, but does not follow commands. Spontaneously moves LLE and spastic movement noted on LUE. Withdraws from pain pain, but no purposeful movement in LUE. She continues to be in a-fib RVR with rates in the low 100s to 120s. Blood pressure has remained within ordered parameters. LS clear, tolerating ventilator. Plan to continue current POC, including another spontaneous breathing trial. Care conference scheduled for Wednesday.

## 2019-08-06 NOTE — PROGRESS NOTES
Critical Care  Note      08/06/2019    Name: Nessa Chan MRN#: [id number]   Age: 84 year old YOB: 1935     Hsptl Day# 2  ICU DAY #2    MV DAY #2             Problem List:   Acute ischemic stroke  Loss of protective airway reflexes         Summary/Hospital Course:     Pt intubated/sedated, unable to obtain histories directly. No events overnight.  Still remains unresponsive off sedation.    84F presented to OSH after being found down and then becoming encephalopathic at her TCU.  Noted to have a leftward gaze and unresponsive.  CT scan at OSH revealed a R insular infarct, followup CTA revealed a left MCA M1 segment high grade stenosis as well as high grade stenosis at the L bifurcation.  Intubated there for airway protection, then tpa given and transferred here where penumbra mechanical intervention was performed.  Has remained intubated with depressed consciousness.       Assessment and plan :     Nessa Chan IS a 84 year old female admitted on 8/4/2019 for acute cva.   I have personally reviewed the daily labs, imaging studies, cultures and discussed the case with referring physician and consulting physicians.     My assessment and plan by system for this patient is as follows:    Neurology/Psychiatry:   1. Acute cva:  Appreciate neuro and IR efforts.  S/p aspiration with penumbra device.  Neuro checks and followup imaging per neuro direction.  ASA and plavix per neuro direction, but presumably holding now due to risk for hemorrhagic conversion in setting of large stroke.  2.  Sedation:  Holding for now to best assess mental status.  Propofol/prn midazolam if needed.  3.  Analgesia:  Prn dilaudid    Cardiovascular:   1. HLD:  Home zetia   2.  HTN:  Nicardipine drip per neuro with sbp goal 140, presumably to prevent hemorrhagic conversion.    3.  Paroxysmal AF: Rate control as needed with metoprolol, continue home metoprolol at 75 bid.  Hold AC for now in setting of large acute cva and  tpa.    Pulmonary/Ventilator Management:   1. Loss of airway protective reflexes:  In setting of cva noted above.  Intubated/ventilated.    GI and Nutrition :   1.  Non-Severe malnutrition in context of acute illness or injury:  Continue tube feeds.  2.  PPI for pud prophy    Renal/Fluids/Electrolytes:   1. Creat 0.77 improving    Infectious Disease:   1. Appears to have UTI on UA (present on admission). Wbc rising despite ctx.  Broaden to zosyn while cultures still pending.    2.  Leukocytosis:  Broadening as above.  Check sputum culture as well.     Endocrine:   1. Hypothyroid:  Continue home levothyroxine  2.  Glucose control as needed.    Hematology/Oncology:   1. Wbc elevated 18.5--monitoring; see ID above.  2.  Hbg ok 10.3  3.  plttls ok 300    ICU Prophylaxis:   1. DVT: hold due to risk of hemorrhagic conversion  2. VAP: HOB 30 degrees, chlorhexidine rinse  3. Stress Ulcer: PPI  4. Restraints: Nonviolent soft two point restraints required and necessary for patient safety and continued cares and good effect as patient continues to pull at necessary lines, tubes despite education and distraction. Will readdress daily.   5. Wound care  -   6. Feeding - tf  7. Family Update: family mtg planned for tomorrow  8. Disposition - icu           Medical History:     PMH ([facility] dischg summary 7/25):  Temporal arteritis  Carotid stenosis bilateral  Depression  Diabetes  Prior GIB while on warfarin  Glaucoma  HLD  HTN  hypoNa  Hypothyroid  Paroxysmal AF  Osteoporosis  ckd    PSH ([facility] 7/25):  Temporal artery biopsy    Home Meds (per [facility] discharge summary 7/25):    Tylenol  Amlodipine 10 daily  zetia 10 daily  Fenofibrate 160 daily  Folate 1 mg daily  Levothyroxine 75 daily  Metoprolol 75 bid  prilosec 20 daily  zoloft 100 daily  Timolol optic 1 drop both eyes bid  b12 1000 mcg daily  ?Eliquis?       Allergies   Allergen Reactions     Ciprofloxacin      Other reaction(s): Dizziness     Hmg-Coa-R  Inhibitors      Myalgias. Zocor, Crestor, Mevacor, Lipitor.        Social hx:  Unable, intubated/sedated.  Fam hs:  Unable, intubated/sedated  ROS: unable, intubated/sedated.           Physical Examination:   Temp:  [88.3  F (31.3  C)-99.3  F (37.4  C)] 98.4  F (36.9  C)  Pulse:  [] 124  Heart Rate:  [] 105  Resp:  [17-34] 29  BP: ()/() 166/107  FiO2 (%):  [35 %] 35 %  SpO2:  [98 %-100 %] 100 %    Intake/Output Summary (Last 24 hours) at 8/4/2019 0152  Last data filed at 8/4/2019 0148  Gross per 24 hour   Intake 1000 ml   Output --   Net 1000 ml     Wt Readings from Last 4 Encounters:   08/06/19 76.9 kg (169 lb 8.5 oz)   08/03/19 70.3 kg (155 lb)     BP - Mean:  [] 113  Ventilation Mode: SIMV  (Synchronized Intermittent Mandatory Ventilation)  FiO2 (%): 35 %  Rate Set (breaths/minute): 12 breaths/min  Tidal Volume Set (mL): 450 mL  PEEP (cm H2O): 5 cmH2O  Pressure Support (cm H2O): 5 cmH2O  Oxygen Concentration (%): 35 %  Resp: 29    Recent Labs   Lab 08/04/19  1117 08/04/19  0502 08/03/19  2200   O2PER 35% 35 percent  2L NC       GEN: no acute distress, intubated  HEENT: head ncat, sclera anicteric, OP patent, trachea midline   PULM: unlabored synchronous with vent, clear anteriorly    CV/COR: RRR S1S2 no gallop,  No rub, no murmur  ABD: soft nontender, hypoactive bowel sounds, no mass  EXT:   Warm x4, no edema  NEURO: unresponsive off sedation, perrl, triple flex resolved today, upgoing toes less pronounced, tone improved.    SKIN: no obvious rash  LINES: clean, dry intact         Data:   All data and imaging reviewed     ROUTINE ICU LABS (Last four results)  CMP  Recent Labs   Lab 08/06/19  0450 08/05/19  0538 08/04/19  0502 08/03/19  2123   * 150* 143 141   POTASSIUM 3.4 3.5 3.5 3.4   CHLORIDE 115* 117* 112* 109   CO2 25 24 25 25   ANIONGAP 7 9 6 7   * 187* 240* 236*   BUN 19 19 20 26   CR 0.77 0.91 1.00 1.06*   GFRESTIMATED 71 58* 52* 48*   GFRESTBLACK 82 67 60* 56*    KADEEM 8.6 8.6 8.2* 9.2   MAG 1.9 2.2 1.7  --    PHOS 2.4* 1.9* 4.3  --    PROTTOTAL  --   --   --  6.8   ALBUMIN  --   --   --  3.1*   BILITOTAL  --   --   --  0.5   ALKPHOS  --   --   --  60   AST  --   --   --  30   ALT  --   --   --  18     CBC  Recent Labs   Lab 08/06/19  0450 08/05/19  0538 08/04/19  0502 08/03/19 2123   WBC 18.5* 16.4* 12.9* 10.1   RBC 3.64* 3.65* 3.65* 4.27   HGB 10.4* 10.3* 10.5* 11.9   HCT 33.4* 33.4* 33.4* 39.5   MCV 92 92 92 93   MCH 28.6 28.2 28.8 27.9   MCHC 31.1* 30.8* 31.4* 30.1*   RDW 16.9* 16.6* 16.2* 16.0*    300 250 319     INR  Recent Labs   Lab 08/03/19  2123   INR 1.09     Arterial Blood Gas  Recent Labs   Lab 08/04/19  1117 08/04/19  0502 08/03/19  2200   O2PER 35% 35 percent  2L NC       All cultures:  Recent Labs   Lab 08/03/19 2204   CULT >100,000 colonies/mL  Enterococcus faecalis  Susceptibility testing in progress  *  >100,000 colonies/mL  Hafnia alvei  Susceptibility testing in progress  *     Recent Results (from the past 24 hour(s))   CT Head w/o Contrast    Narrative    CT SCAN OF THE HEAD WITHOUT CONTRAST   8/3/2019 9:35 PM     HISTORY: Altered mental status. (LOC), unexplained    TECHNIQUE:  Axial images of the head and coronal reformations without  IV contrast material. Radiation dose for this scan was reduced using  automated exposure control, adjustment of the mA and/or kV according  to patient size, or iterative reconstruction technique.    COMPARISON: None.    FINDINGS:     Intracranial contents: There is diffuse parenchymal volume loss.   White matter changes are present in the cerebral hemispheres that are  consistent with small vessel ischemic disease in this age patient.  There is low-density in the right insular cortex region. This is best  seen on axial images 16 and 17. This is consistent with a cortical  infarct. The age of this is uncertain. No hemorrhage or mass effect.    Visualized orbits/sinuses/mastoids:  The visualized portions of  the  sinuses and mastoids appear normal.    Osseous structures/soft tissues:  There is no evidence of trauma.      Impression    IMPRESSION:   1. No intracranial hemorrhage.  2. Low dense area in the right insular cortex region. This is  consistent with an infarct. Age of this is uncertain. It could be  chronic.  3. There is diffuse parenchymal volume loss.  White matter changes are  present in the cerebral hemispheres that are consistent with small  vessel ischemic disease in this age patient.      CURTIS AVILA MD   CTA Head Neck with Contrast    Narrative    CT ANGIOGRAM OF THE HEAD AND NECK  8/3/2019 9:45 PM     HISTORY: Altered mental status. Code stroke    TECHNIQUE:  Precontrast localizing scans were followed by CT  angiography with an injection of 70 mL IV contrast with scans through  the head and neck.  Images were transferred to a separate 3-D  workstation where multiplanar reformations and 3-D images were  created.  Estimates of carotid stenoses are made relative to the  distal internal carotid artery diameters except as noted. Radiation  dose for this scan was reduced using automated exposure control,  adjustment of the mA and/or kV according to patient size, or iterative  reconstruction technique.    COMPARISON: None.    FINDINGS: Estimates of stenosis at the carotid bifurcations are  relative to the distal internal carotids.    Arch: Calcified atherosclerotic plaque is seen in the arch of the  aorta. Extensive calcified atherosclerotic plaque is seen at the  origin of the right subclavian artery.    Neck:  Right Carotid:  The right common carotid artery is normal.  Calcified  atherosclerotic plaque is seen at the right carotid bifurcation. The  degree of stenosis is less than 50%..  The right internal carotid  artery is negative.     Left Carotid:  The left common carotid artery is unremarkable.   Calcified atherosclerotic plaque is seen at the left carotid  bifurcation. There is a severe stenosis at the  bifurcation.  The left  internal carotid is negative.      Vertebrals:  The right vertebral artery is occluded proximally 2 cm  distal to the origin. There is collateral filling of the distal, V4  segment of the right vertebral artery. The left vertebral artery is a  large vessel and it supplies the basilar artery..    Head:  Right Carotid:No occluded vessels are seen. .    Left Carotid: There is a filling defect in the M1 segment of the left  middle cerebral artery consistent with thromboembolic disease. This is  causing a high-grade stenosis, near occlusion...  There is filling of  the inferior division of the left middle cerebral..    Basilar:  The basilar artery and its branches appear normal.     Miscellaneous: None      Impression    IMPRESSION:   1. Filling defect in the M1 segment of the left middle cerebral  leading to a high-grade stenosis. There is filling of inferior  division of the left middle cerebral.   2. High-grade stenosis at the left carotid bifurcation.  3. Mild stenosis right carotid bifurcation.  4. 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    Report was called to Dr. Gonzalez at 9:50 PM   XR Chest Port 1 View    Narrative    XR CHEST PORTABLE 1 VIEW   8/3/2019 10:45 PM     HISTORY: Post intubation.    COMPARISON: None.    FINDINGS: Supine portable chest. ET tube in place with tip  approximately 3 cm above the thomas. NG tube tip is near the  gastroesophageal junction. No pneumothorax. The heart is enlarged.  There is no pulmonary edema. Thoracic aorta is calcified. Mild left  basilar atelectasis or scarring. The lungs are otherwise clear.      Impression    IMPRESSION: ET tube 3 cm above the thomas. NG tube tip near the  gastroesophageal junction.     Labs (personally reviewed/interpreted):  See a/p.  CT head (personally reviewed/interpreted):  Expected progression of existing strokes.  No hemorrhagic conversion.    Billing:  This patient is critically ill: Yes. Total critical care time today 45 min.

## 2019-08-07 NOTE — PLAN OF CARE
Pt VSS, afebrile and cannot make her needs known.  Pt's family had a care conference today and pt was made a DNR and will go comfort care when rest of family gets into town to visit her.  Pt is less responsive today than yesterday, see epic for neuro details.  Pt has a lindo in place with adequate UO.  Pt was turned and had oral cares every couple hours.  Pt's TF is at goal rate and she is tolerating it well.  Pt tele was A-fib with RVR.

## 2019-08-07 NOTE — PROGRESS NOTES
[facility] ICU RESPIRATORY NOTE    Date of Admission: 8/4/2019  Date of Intubation (most recent): 8/4/2019  Reason for Mechanical Ventilation: Airway protection  Number of Days on Mechanical Ventilation: 4  Met Criteria for Pressure Support Trial: No   Reason for No Pressure Support Trial: per MD    Significant Events Today: None overnight    ABG Results:   Recent Labs   Lab 08/04/19  1117 08/04/19  0502 08/03/19  2200   O2PER 35% 35 percent  2L NC     Current Vent Settings: Ventilation Mode: SIMV  (Synchronized Intermittent Mandatory Ventilation)  FiO2 (%): 35 %  Rate Set (breaths/minute): 12 breaths/min  Tidal Volume Set (mL): 450 mL  PEEP (cm H2O): 5 cmH2O  Pressure Support (cm H2O): 5 cmH2O  Oxygen Concentration (%): 35 %  Resp: 30    Plan: Continue patient on full ventilatory support and assess for weaning readiness daily.

## 2019-08-07 NOTE — PROGRESS NOTES
"Stroke Service Progress Note  Remains intubated. No appreciable wakefulness despite being off sedation for days.     08/07/2019    ON events:   - started modafinil without appreciable change in wakefulness  - family meeting today    Problem List:  Patient Active Problem List   Diagnosis     Acute CVA (cerebrovascular accident) (H)     Benign essential hypertension     Carotid stenosis     Chronic kidney disease (CKD) stage G3a/A2, moderately decreased glomerular filtration rate (GFR) between 45-59 mL/min/1.73 square meter and albuminuria creatinine ratio between  mg/g (H)     Diabetes mellitus, type II (H)     Acute ischemic right middle cerebral artery (MCA) stroke (H)     Giant cell arteritis (H)     GERD (gastroesophageal reflux disease)     Hypothyroidism     Mixed hyperlipidemia     Paroxysmal atrial fibrillation (H)     Back pain without radiation       Current Medications:    aspirin  81 mg Oral Daily     ceFEPIme (MAXIPIME) IV  1 g Intravenous Q24H     chlorhexidine  15 mL Mouth/Throat Q12H     ezetimibe  10 mg Oral Daily     folic acid  1 mg Oral Daily     levothyroxine  75 mcg Oral QAM AC     metoprolol  75 mg Oral BID     modafinil  200 mg Oral or Feeding Tube Daily     pantoprazole  40 mg Oral QAM AC     timolol maleate  1 drop Both Eyes BID       Infusions:    sodium chloride 0.9 %       IV fluid REPLACEMENT ONLY       insulin (regular) 3 Units/hr (08/07/19 1156)     niCARdipine 40 mg in 200 mL 0.9% NaCl Stopped (08/05/19 0949)     propofol (DIPRIVAN) infusion Stopped (08/04/19 0945)     sodium chloride 10 mL/hr at 08/07/19 0844     Physical Examination:   BP (!) 145/82   Pulse 95   Temp 97.4  F (36.3  C) (Axillary)   Resp 25   Ht 1.626 m (5' 4.02\")   Wt 77.5 kg (170 lb 13.7 oz)   SpO2 98%   BMI 29.31 kg/m      Exam:  Neurologic: On exam, she remains intubated. NO effort to interact with examiners is observed. Eyes open rarely with vigorous noxious stimulus. Blink to threat bilaterally.  " "Leftward gaze preference but able to cross midline with oculocephalic maneuver.  Pupils are symmetric, 1 to 2 mm and react light.  No clear facial asymmetry.  No response to noxious stimulus in the right arm, no movements of any kind noted in the right arm, triple flexion in the right leg, withdrawal in the left leg, localization and left arm, although to a lesser extent than previous exam.  Diffuse hyperreflexia with clonus in the left arm.  Upgoing plantar response bilaterally.  Irregular cardiac rhythm.  Mechanically ventilated.  Moderate edema throughout.  Scattered ecchymoses.    National Institutes [employer] Stroke Scale  Exam Interval: Day 4   Score    Level of consciousness: (3)   Responds w/ reflex motor/autonomic effect or no response    LOC questions: (2)   Answers neither question correctly    LOC commands: (2)   Performs neither task correctly    Best gaze: (1)   Partial gaze palsy    Visual: (0)   No visual loss    Facial palsy: (0)   Normal symmetrical movements    Motor arm (left): (2)   Some effort against gravity    Motor arm (right): (4)   No movement    Motor leg (left): (3)   No effort against gravity    Motor leg (right): (3)   No effort against gravity    Limb ataxia: (0)   Absent    Sensory: (0)   Normal- no sensory loss    Best language: (3)   Mute- global aphasia    Dysarthria: UN Intubated or other physical barrier: intubated    Extinction and inattention: (0)   No abnormality        Total Score:  23     Labs/Studies: Imaging results copied directly to ensure accuracy  Lab Results   Component Value Date    A1C 7.1 08/04/2019     LDL checked during recent admission 58 on 7/26    MRI:   \"IMPRESSION:  1.  Multiple areas of restricted diffusion in the right and left  frontal lobes, the left basal ganglia, and in the cortex of the left  parietal lobe. These are consistent with evolving infarcts.   2. There are susceptibility hypointensities in both the right and left  frontal lobes and left " "basal ganglia. This is likely due to petechial  hemorrhage. This corresponds to the areas of high density seen on the  previous head CT.  3. Mild mass effect on the left lateral ventricle, but no significant  shift of midline structures.\"    CTH follow-up:  \"IMPRESSION: Evolving bilateral middle cerebral artery ischemic  infarcts. No evidence for any hemorrhagic transformation or  significant mass effect.\"    Echo:  \"Interpretation Summary     A contrast injection (Bubble Study) was performed that was negative for flow  across the interatrial septum.  The visual ejection fraction is estimated at 50-55%.  Left ventricular systolic function is low normal.  There is mild to moderate (1-2+) mitral regurgitation.  There is mild to moderate (1-2+) tricuspid regurgitation.  Right ventricular systolic pressure is elevated, consistent with mild to  moderate pulmonary hypertension.  The rhythm was rapid atrial fibrillation.  The study was technically difficult. The study was technically limited.\"    ASSESSMENT/PLAN:   Ms Chan is an 84-year-old woman with history of atrial fibrillation (previously on apixaban) currently admitted to the ICU for treatment of now bilateral MCA territory strokes. Four days from her most recent stroke, she has shown little to no neurologic improvement. Unfortunately, she is likely to face profound lifelong deficits affecting speech and swallowing, ambulation, and wakefulness among others. A family meeting was held today during which multiple family members expressed agreement that the predicted level of disability is incompatible with an acceptable quality of life to her. They plan to have family members visit her before transitioning the focus of her cares to a comfort based approach. Palliative care on board. Code status changed to DNR. In the meantime, maximal medical therapy outlined below is in place.    #Bilateral MCA territory infarctions, status post thrombectomy x2 and alteplase x1  - " "Systolic blood pressure goal less than 180  - Neurochecks every 2 hours  - Holding anticoagulation for now in setting of large stroke burden  - aspirin 81mg daily until anticoagulation can be resumed  - Please avoid use of hypotonic solutions, such as D5 or half-normal saline, as they worsen cerebral edema  - modafinil 200mg daily  - hydration and nutrition via feeding tube  - Head of bed elevated 30 degrees  - goal normonatremia, but will permit hypernatremia  -Telemetry  -Stroke education  -PT/OT/speech therapy when appropriate    =========================================================    This patient was seen & discussed with attending neurologist, Dr. Rivero.     Mario Castano,   PGY5 Neurocritical Care Fellow    08/07/2019 1:12 PM  Text Page (8am-7pm)  To page stroke neurology after hours or on a subsequent day, click here: [facility]  Choose \"On Call\" tab at top, then search dropdown box for \"Neurology Adult\" & press Enter, look for Neuro ICU/Stroke          "

## 2019-08-07 NOTE — PROGRESS NOTES
Notified by pharmacy that patient has Hafnia UTI. Sensitive to cefepime, so switched from Zosyn to cefepime.    Rip Angeles MD  [facility] Intensivist [facility]  \

## 2019-08-07 NOTE — PROGRESS NOTES
[facility] ICU RESPIRATORY NOTE        Date of Admission: 8/4/2019  Date of Intubation (most recent): 8/4/19  Reason for Mechanical Ventilation: Airway protection  Number of Days on Mechanical Ventilation: 4    Met Criteria for Pressure Support Trial: No  Reason for No Pressure Support Trial: Per MD    Significant Events Today: None    ABG Results:   Recent Labs   Lab 08/04/19  1117 08/04/19  0502 08/03/19  2200   O2PER 35% 35 percent  2L NC       Current Vent Settings: Ventilation Mode: SIMV/PS  (Synchronized Intermittent Mandatory Ventilation with Pressure Support)  FiO2 (%): 35 %  Rate Set (breaths/minute): 12 breaths/min  Tidal Volume Set (mL): 450 mL  PEEP (cm H2O): 5 cmH2O  Pressure Support (cm H2O): 5 cmH2O  Oxygen Concentration (%): 35 %  Resp: 14      Plan: Continue full ventilatory support.    Jenelle Chavez, RRT

## 2019-08-07 NOTE — PLAN OF CARE
Neuro: Obtunded, withdraws in LUE/LLE, flexion in RUE/RLE, PERRLA but not tracking, roving eye movements, blinks to threat.     Respiratory: LS coarse, SIMV support overnight.     Cardiac: Tele is Afib with RVR and BBB    GI: BSx4, TF@30ml/hr, increase to goal later today    : lindo patent with adequate output    Skin: protective mepilex to sacrum over blancheable erythema.     Pain: CPOT negative    Mobility: Repositioned Q2hr       Family updated and involved in plan of care.

## 2019-08-07 NOTE — CONSULTS
[facility]    Palliative Care Consultation     Nessa Chan  MRN# [id number]  Date of Admission:  8/4/2019  Date of Service (when I saw the patient): 08/07/19  Reason for consult: Consulted by Dr. Castano for Goals of care  Patient and family support    Assessment & Plan   Ms Chan is an 84-year-old woman with history of atrial fibrillation (previously on apixaban) currently admitted to the ICU for treatment of now bilateral MCA territory strokes. Minimal improvement in exam and wakefulness. Palliative Care team consulted to assist with goals of care discussions considering poor prognosis.     Goals of Care Discussion  Introduced the scope of our practice to Ms Chan's family. Discussed our potential roles for symptom management, support/coping, and decisional support (aka goals of care).     Participated in a family meeting this afternoon. Medical providers present included Dr Castano (Neurocritical care), Dr Santana (ICU), and Carl (ICU RN). Pt had several family members present including her  (Rad), and several other relatives including siblings, children, and grandchildren. Dr Castano reviewed Ms Chan's history and provided an update on her current state of health. He and Dr Santana shared their worry with pt's family that Ms Chan will not have a meaningful recovery (likely will not be awake and interactive with family, likely will not be able to ambulate again, etc.) Ms Chan's family clearly indicated this would not be an acceptable quality of life for the patient. We discussed next steps which would include transitioning to a comfort focused plan of care. We discussed administering medications to alleviate pain/air hunger and removing the breathing tube. We discussed that it is difficult to predict how long she will live after her breathing tube is removed (hours/days?) but that we would continue to provide comfort measures to her during this time. The family had questions re: whether  or not they could bring Ms Chan home with hospice cares. We discussed that this would likely be a difficult transition, but can be further discussed in the coming days if she appears stable after extubation. Family understood and agreeable to continuing with end of life cares here as we anticipate she will not make it out of the hospital. We encouraged the family to contact other family members who would want to come and see Ms Chan prior to extubation, anticipate a transition to comfort measures in the coming days.     Support/Coping  -as above, several family members present for meeting  -Will involve Palliative LICSW, Ayesha Crabtree, and/or Palliative , Lulu Ramirez    Decisional Support, Goals of Care, Counseling & Coordination  Decisional Capacity Intact?  -No  Health Care Directive on File?  -No  Code Status/Resuscitation Preferences?  -DNR - per discussion during today   Plan of Care?  -anticipate transition to comfort measures in the coming days    Thank you for involving us in the care of this patient and family. We will continue to follow. Please do not hesitate to contact me with questions or concerns or the on-call provider for our team if evening or weekend.    Frances TAN, [facility]  Palliative Medicine   Pager [phone]    Attestation:  Total time on the floor involved in the patient's care: 80 minutes  Total time spent in counseling/care coordination: >50%    Chief Complaint   joan CVA    History is obtained from the patient's family, staff, and extensive chart review.     Adopted from H&P  Pt intubated/sedated on arrival.  Unable to obtain histories directly.  History obtained from chart and report from ED physician.     84F presented to OSH after being found down and then becoming encephalopathic at her TCU.  Noted to have a leftward gaze and unresponsive.  CT scan at OSH revealed a R insular infarct, followup CTA revealed a left MCA M1 segment high grade stenosis as well as high  grade stenosis at the L bifurcation.  Intubated there for airway protection, then tpa given and transferred here for intervention.    Past Medical History    I have reviewed this patient's medical history and updated it with pertinent information if needed.   Past Medical History:   Diagnosis Date     Atrial fibrillation (H)      Carotid stenosis      CKD (chronic kidney disease)     stage 3     Diabetes mellitus (H)     type II     Glaucoma      History of GI bleed 06/11/2010    due to coumadin     Hyperlipemia      Hypertension      Hypothyroid      Osteoporosis        Past Surgical History   I have reviewed this patient's surgical history and updated it with pertinent information if needed.  Past Surgical History:   Procedure Laterality Date     IR CAROTID CEREBRAL ANGIOGRAM LEFT  8/4/2019       Social History   Living situation: came from [facility]    Family system:  to Rad, several adult children and grandchildren as well as siblings involved in her care    Self-identified support system: unable to assess    Employment/education: not assessed     Activities/interests: gardening, very active     Use of community resources: none known    Restoration affiliation: not very Protestant, but family identified her as Restorationist    Involvement in mike community: no    Impact of illness on patient: not assessed     Family History   I have reviewed this patient's family history and updated it with pertinent information if needed.   No family history on file.    Allergies   Allergies   Allergen Reactions     Ciprofloxacin      Other reaction(s): Dizziness     Hmg-Coa-R Inhibitors      Myalgias. Zocor, Crestor, Mevacor, Lipitor.        Medications   Current Facility-Administered Medications Ordered in Epic   Medication Dose Route Frequency Last Rate Last Dose     0.9% sodium chloride TABLE SOLN  1,000 mL TABLE SOLN Continuous PRN         acetaminophen (TYLENOL) solution 650 mg  650 mg Oral Q4H PRN   650 mg at 08/06/19 8733      albuterol (PROVENTIL) neb solution 2.5 mg  2.5 mg Nebulization Q2H PRN   2.5 mg at 08/07/19 0836     aspirin EC tablet 81 mg  81 mg Oral Daily   81 mg at 08/07/19 0801     ceFEPIme (MAXIPIME) 1g vial to attach to  ml bag for ADULTS or NS 50 ml bag for PEDS  1 g Intravenous Q24H         chlorhexidine (PERIDEX) 0.12 % solution 15 mL  15 mL Mouth/Throat Q12H   15 mL at 08/07/19 0755     dextrose 10 % 1,000 mL infusion   Intravenous Continuous PRN         glucose gel 15-30 g  15-30 g Oral Q15 Min PRN        Or     dextrose 50 % injection 25-50 mL  25-50 mL Intravenous Q15 Min PRN        Or     glucagon injection 1 mg  1 mg Subcutaneous Q15 Min PRN         ezetimibe (ZETIA) tablet 10 mg  10 mg Oral Daily   10 mg at 08/07/19 0801     folic acid (FOLVITE) tablet 1 mg  1 mg Oral Daily   1 mg at 08/07/19 0801     hydrALAZINE (APRESOLINE) injection 10-20 mg  10-20 mg Intravenous Q30 Min PRN         HYDROmorphone (PF) (DILAUDID) injection 0.2 mg  0.2 mg Intravenous Q2H PRN         insulin 1 unit/mL in saline (NovoLIN, HumuLIN Regular) drip - ADULT IV Infusion  0-24 Units/hr Intravenous Continuous 1.5 mL/hr at 08/07/19 0844 1.5 Units/hr at 08/07/19 0844     labetalol (NORMODYNE/TRANDATE) injection 10-20 mg  10-20 mg Intravenous Q10 Min PRN   10 mg at 08/04/19 1308     levothyroxine (SYNTHROID/LEVOTHROID) tablet 75 mcg  75 mcg Oral QAM AC   75 mcg at 08/07/19 0630     magnesium sulfate 2 g in water intermittent infusion  2 g Intravenous Daily PRN   2 g at 08/06/19 0537     magnesium sulfate 4 g in 100 mL sterile water (premade)  4 g Intravenous Q4H PRN         metoprolol (FIRST-METOPROLOL) solution 75 mg  75 mg Oral BID   75 mg at 08/07/19 0801     miconazole (MICATIN) 2 % cream   Topical Q1H PRN         midazolam (VERSED) injection 2-4 mg  2-4 mg Intravenous Q1H PRN         modafinil (PROVIGIL) tablet 200 mg  200 mg Oral or Feeding Tube Daily   200 mg at 08/07/19 0801     naloxone (NARCAN) injection 0.1-0.4 mg   0.1-0.4 mg Intravenous Q2 Min PRN         niCARdipine 40 mg in 200 mL 0.9% NaCl (CARDENE) infusion  2.5-15 mg/hr Intravenous Continuous   Stopped at 08/05/19 0949     ondansetron (ZOFRAN-ODT) ODT tab 4 mg  4 mg Oral Q6H PRN        Or     ondansetron (ZOFRAN) injection 4 mg  4 mg Intravenous Q6H PRN         pantoprazole (PROTONIX) 2 mg/mL suspension 40 mg  40 mg Oral QAM AC   40 mg at 08/07/19 0631     potassium chloride (KLOR-CON) Packet 20-40 mEq  20-40 mEq Oral or Feeding Tube Q2H PRN   40 mEq at 08/07/19 0718     potassium chloride 10 mEq in 100 mL intermittent infusion with 10 mg lidocaine  10 mEq Intravenous Q1H PRN   10 mEq at 08/04/19 0852     potassium chloride 10 mEq in 100 mL sterile water intermittent infusion (premix)  10 mEq Intravenous Q1H PRN         potassium chloride 20 mEq in 50 mL intermittent infusion  20 mEq Intravenous Q1H PRN         potassium chloride ER (K-DUR/KLOR-CON M) CR tablet 20-40 mEq  20-40 mEq Oral Q2H PRN         potassium phosphate 10 mmol in D5W 250 mL intermittent infusion  10 mmol Intravenous Daily PRN         potassium phosphate 15 mmol in D5W 250 mL intermittent infusion  15 mmol Intravenous Daily PRN   15 mmol at 08/06/19 0645     potassium phosphate 20 mmol in D5W 250 mL intermittent infusion  20 mmol Intravenous Q6H PRN         potassium phosphate 20 mmol in D5W 500 mL intermittent infusion  20 mmol Intravenous Q6H PRN   20 mmol at 08/05/19 1213     potassium phosphate 25 mmol in D5W 500 mL intermittent infusion  25 mmol Intravenous Q8H PRN         propofol (DIPRIVAN) infusion  5-75 mcg/kg/min Intravenous Continuous   Stopped at 08/04/19 0945    And     propofol (DIPRIVAN) infusion 10-20 mg  10-20 mg Intravenous Q30 Min PRN         senna-docusate (SENOKOT-S/PERICOLACE) 8.6-50 MG per tablet 1 tablet  1 tablet Oral BID PRN        Or     senna-docusate (SENOKOT-S/PERICOLACE) 8.6-50 MG per tablet 2 tablet  2 tablet Oral BID PRN         sodium chloride 0.9% infusion    Intravenous Continuous 10 mL/hr at 08/07/19 0844       timolol maleate (TIMOPTIC) 0.5 % ophthalmic solution 1 drop  1 drop Both Eyes BID   1 drop at 08/07/19 0802     No current [facility]-ordered outpatient medications on file.       Review of Systems   The comprehensive review of systems is negative other than noted in the assessment/plan.    Physical Exam   Temp: 97.4  F (36.3  C) Temp src: Axillary BP: 120/72 Pulse: 105 Heart Rate: 101 Resp: 24 SpO2: 98 % O2 Device: Mechanical Ventilator    Vitals:    08/05/19 0600 08/06/19 0000 08/07/19 0400   Weight: 75.1 kg (165 lb 9.1 oz) 76.9 kg (169 lb 8.5 oz) 77.5 kg (170 lb 13.7 oz)     CONSTITUTIONAL: obtunded.  HEENT: NCAT  NECK: Supple  CARDIOVASCULAR: exam deferred   RESPIRATORY: vented   GASTROINTESTINAL: exam deferred   MUSCULOSKELETAL: No movement witnessed  SKIN: Warm and intact. No concerning lesions or rashes on exposed skin surfaces   NEUROLOGIC: obtunded   PSYCH: obtunded     Data   Results for orders placed or performed during the hospital encounter of 08/04/19 (from the past 24 hour(s))   Glucose by meter   Result Value Ref Range    Glucose 110 (H) 70 - 99 mg/dL   Glucose by meter   Result Value Ref Range    Glucose 97 70 - 99 mg/dL   Glucose by meter   Result Value Ref Range    Glucose 112 (H) 70 - 99 mg/dL   Glucose by meter   Result Value Ref Range    Glucose 130 (H) 70 - 99 mg/dL   Glucose by meter   Result Value Ref Range    Glucose 119 (H) 70 - 99 mg/dL   Glucose by meter   Result Value Ref Range    Glucose 129 (H) 70 - 99 mg/dL   Glucose by meter   Result Value Ref Range    Glucose 169 (H) 70 - 99 mg/dL   Glucose by meter   Result Value Ref Range    Glucose 108 (H) 70 - 99 mg/dL   Glucose by meter   Result Value Ref Range    Glucose 102 (H) 70 - 99 mg/dL   Glucose by meter   Result Value Ref Range    Glucose 164 (H) 70 - 99 mg/dL   Glucose by meter   Result Value Ref Range    Glucose 137 (H) 70 - 99 mg/dL   Glucose by meter   Result Value Ref Range     Glucose 131 (H) 70 - 99 mg/dL   Glucose by meter   Result Value Ref Range    Glucose 127 (H) 70 - 99 mg/dL   Glucose by meter   Result Value Ref Range    Glucose 94 70 - 99 mg/dL   Glucose by meter   Result Value Ref Range    Glucose 109 (H) 70 - 99 mg/dL   CBC with platelets   Result Value Ref Range    WBC 23.0 (H) 4.0 - 11.0 10e9/L    RBC Count 3.73 (L) 3.8 - 5.2 10e12/L    Hemoglobin 10.6 (L) 11.7 - 15.7 g/dL    Hematocrit 34.0 (L) 35.0 - 47.0 %    MCV 91 78 - 100 fl    MCH 28.4 26.5 - 33.0 pg    MCHC 31.2 (L) 31.5 - 36.5 g/dL    RDW 16.8 (H) 10.0 - 15.0 %    Platelet Count 298 150 - 450 10e9/L   Basic metabolic panel   Result Value Ref Range    Sodium 143 133 - 144 mmol/L    Potassium 3.3 (L) 3.4 - 5.3 mmol/L    Chloride 110 (H) 94 - 109 mmol/L    Carbon Dioxide 25 20 - 32 mmol/L    Anion Gap 8 3 - 14 mmol/L    Glucose 108 (H) 70 - 99 mg/dL    Urea Nitrogen 13 7 - 30 mg/dL    Creatinine 0.86 0.52 - 1.04 mg/dL    GFR Estimate 62 >60 mL/min/[1.73_m2]    GFR Estimate If Black 72 >60 mL/min/[1.73_m2]    Calcium 8.4 (L) 8.5 - 10.1 mg/dL   Magnesium   Result Value Ref Range    Magnesium 2.0 1.6 - 2.3 mg/dL   Phosphorus   Result Value Ref Range    Phosphorus 3.3 2.5 - 4.5 mg/dL   Glucose by meter   Result Value Ref Range    Glucose 124 (H) 70 - 99 mg/dL   Glucose by meter   Result Value Ref Range    Glucose 116 (H) 70 - 99 mg/dL   Glucose by meter   Result Value Ref Range    Glucose 131 (H) 70 - 99 mg/dL

## 2019-08-07 NOTE — PROGRESS NOTES
Critical Care  Note      08/07/2019    Name: Nessa Chan MRN#: [id number]   Age: 84 year old YOB: 1935     Hsptl Day# 3  ICU DAY #3    MV DAY #3             Problem List:   Acute ischemic stroke  Loss of protective airway reflexes         Summary/Hospital Course:     Pt intubated/sedated, unable to obtain histories directly. No events overnight.  Still remains unresponsive off sedation.    84F presented to OSH after being found down and then becoming encephalopathic at her TCU.  Noted to have a leftward gaze and unresponsive.  CT scan at OSH revealed a R insular infarct, followup CTA revealed a left MCA M1 segment high grade stenosis as well as high grade stenosis at the L bifurcation.  Intubated there for airway protection, then tpa given and transferred here where penumbra mechanical intervention was performed.  Has remained intubated with depressed consciousness.       Assessment and plan :     Nessa Chan IS a 84 year old female admitted on 8/4/2019 for acute cva.   I have personally reviewed the daily labs, imaging studies, cultures and discussed the case with referring physician and consulting physicians.     My assessment and plan by system for this patient is as follows:    Neurology/Psychiatry:   1. Acute cva:  Appreciate neuro and IR efforts.  S/p aspiration with penumbra device.  Neuro checks and followup imaging per neuro direction.  ASA and plavix per neuro direction, now restarted on ASA.  2.  Sedation:  Holding for now to best assess mental status.  Propofol/prn midazolam if needed.  3.  Analgesia:  Prn dilaudid    Cardiovascular:   1. HLD:  Home zetia   2.  HTN:  Nicardipine drip per neuro with sbp goal 140, presumably to prevent hemorrhagic conversion.    3.  Paroxysmal AF: Rate control as needed with metoprolol, continue home metoprolol at 75 bid.  Hold AC for now in setting of large acute cva and tpa.    Pulmonary/Ventilator Management:   1. Loss of airway protective  reflexes:  In setting of cva noted above.  Intubated/ventilated.    GI and Nutrition :   1.  Non-Severe malnutrition in context of acute illness or injury:  Continue tube feeds.  2.  PPI for pud prophy    Renal/Fluids/Electrolytes:   1. Creat 0.86 stable    Infectious Disease:   1. Appears to have UTI on UA (present on admission). Susceptibilities now show not susceptible to zosyn.  Switch to cefepime.  2.  Leukocytosis:  Broadening as above.  Sputum cx neg to date.     Endocrine:   1. Hypothyroid:  Continue home levothyroxine  2.  Glucose control as needed.    Hematology/Oncology:   1. Wbc elevated 23 -- likely due to UTI noted above  2.  Hbg ok 10.6  3.  plttls ok 298    ICU Prophylaxis:   1. DVT: start subcut heparin today.  2. VAP: HOB 30 degrees, chlorhexidine rinse  3. Stress Ulcer: PPI  4. Restraints: Nonviolent soft two point restraints required and necessary for patient safety and continued cares and good effect as patient continues to pull at necessary lines, tubes despite education and distraction. Will readdress daily.   5. Wound care  -   6. Feeding - tf  7. Family Update: see below  8. Disposition - icu    Family mtg:  Held with numerous family members including pt's , adult children and grandchildren, and neuro and palliative care teams.  Discussed her condition and overall poor neurological prognosis, as well as the fact that she unlikely would ever maintain a sustainable state of health off of a ventilator, and she unlikely would ever regain a sufficiently functional status to return home.  They were understanding of this and seem to be moving toward a comfort-based approach.  Transitioned to DNR status.  All questions asked and answered.         Medical History:     [facility] ([facility] dischg summary 7/25):  Temporal arteritis  Carotid stenosis bilateral  Depression  Diabetes  Prior GIB while on warfarin  Glaucoma  HLD  HTN  hypoNa  Hypothyroid  Paroxysmal AF  Osteoporosis  ckd    [facility]  ([facility] 7[facility]):  Temporal artery biopsy    Home Meds (per Critical access hospital discharge summary 7/25):    Tylenol  Amlodipine 10 daily  zetia 10 daily  Fenofibrate 160 daily  Folate 1 mg daily  Levothyroxine 75 daily  Metoprolol 75 bid  prilosec 20 daily  zoloft 100 daily  Timolol optic 1 drop both eyes bid  b12 1000 mcg daily  ?Eliquis?       Allergies   Allergen Reactions     Ciprofloxacin      Other reaction(s): Dizziness     Hmg-Coa-R Inhibitors      Myalgias. Zocor, Crestor, Mevacor, Lipitor.        Social hx:  Unable, intubated/sedated.  Fam hs:  Unable, intubated/sedated  ROS: unable, intubated/sedated.           Physical Examination:   Temp:  [97.4  F (36.3  C)-100  F (37.8  C)] 97.4  F (36.3  C)  Pulse:  [] 95  Heart Rate:  [] 101  Resp:  [14-59] 14  BP: (110-168)/() 138/68  FiO2 (%):  [35 %] 35 %  SpO2:  [96 %-100 %] 98 %    Intake/Output Summary (Last 24 hours) at 8/4/2019 0152  Last data filed at 8/4/2019 0148  Gross per 24 hour   Intake 1000 ml   Output --   Net 1000 ml     Wt Readings from Last 4 Encounters:   08/07/19 77.5 kg (170 lb 13.7 oz)   08/03/19 70.3 kg (155 lb)     BP - Mean:  [] 93  Ventilation Mode: SIMV/PS  (Synchronized Intermittent Mandatory Ventilation with Pressure Support)  FiO2 (%): 35 %  Rate Set (breaths/minute): 12 breaths/min  Tidal Volume Set (mL): 450 mL  PEEP (cm H2O): 5 cmH2O  Pressure Support (cm H2O): 5 cmH2O  Oxygen Concentration (%): 35 %  Resp: 14    Recent Labs   Lab 08/04/19  1117 08/04/19  0502 08/03/19  2200   O2PER 35% 35 percent  2L NC       GEN: no acute distress, intubated  HEENT: head ncat, sclera anicteric, OP patent, trachea midline   PULM: unlabored synchronous with vent, clear anteriorly    CV/COR: RRR S1S2 no gallop,  No rub, no murmur  ABD: soft nontender, hypoactive bowel sounds, no mass  EXT:   Warm x4, no edema  NEURO: unresponsive off sedation, perrl, triple flex resolved today, upgoing toes less pronounced, tone improved.     SKIN: no obvious rash  LINES: clean, dry intact         Data:   All data and imaging reviewed     ROUTINE ICU LABS (Last four results)  CMP  Recent Labs   Lab 08/07/19  0540 08/06/19  0450 08/05/19  0538 08/04/19  0502 08/03/19 2123    147* 150* 143 141   POTASSIUM 3.3* 3.4 3.5 3.5 3.4   CHLORIDE 110* 115* 117* 112* 109   CO2 25 25 24 25 25   ANIONGAP 8 7 9 6 7   * 154* 187* 240* 236*   BUN 13 19 19 20 26   CR 0.86 0.77 0.91 1.00 1.06*   GFRESTIMATED 62 71 58* 52* 48*   GFRESTBLACK 72 82 67 60* 56*   KADEEM 8.4* 8.6 8.6 8.2* 9.2   MAG 2.0 1.9 2.2 1.7  --    PHOS 3.3 2.4* 1.9* 4.3  --    PROTTOTAL  --   --   --   --  6.8   ALBUMIN  --   --   --   --  3.1*   BILITOTAL  --   --   --   --  0.5   ALKPHOS  --   --   --   --  60   AST  --   --   --   --  30   ALT  --   --   --   --  18     CBC  Recent Labs   Lab 08/07/19  0540 08/06/19  0450 08/05/19  0538 08/04/19  0502   WBC 23.0* 18.5* 16.4* 12.9*   RBC 3.73* 3.64* 3.65* 3.65*   HGB 10.6* 10.4* 10.3* 10.5*   HCT 34.0* 33.4* 33.4* 33.4*   MCV 91 92 92 92   MCH 28.4 28.6 28.2 28.8   MCHC 31.2* 31.1* 30.8* 31.4*   RDW 16.8* 16.9* 16.6* 16.2*    300 300 250     INR  Recent Labs   Lab 08/03/19  2123   INR 1.09     Arterial Blood Gas  Recent Labs   Lab 08/04/19  1117 08/04/19  0502 08/03/19  2200   O2PER 35% 35 percent  2L NC       All cultures:  Recent Labs   Lab 08/06/19  0845 08/03/19  2204   CULT Culture in progress >100,000 colonies/mL  Enterococcus faecalis  *  >100,000 colonies/mL  Félix reynolds  *  Susceptibility testing requested by  Dr. Espitia for Meropenem on Félix reynolds 8.7.19 @0845 AV       Recent Results (from the past 24 hour(s))   CT Head w/o Contrast    Narrative    CT SCAN OF THE HEAD WITHOUT CONTRAST   8/3/2019 9:35 PM     HISTORY: Altered mental status. (LOC), unexplained    TECHNIQUE:  Axial images of the head and coronal reformations without  IV contrast material. Radiation dose for this scan was reduced using  automated exposure  control, adjustment of the mA and/or kV according  to patient size, or iterative reconstruction technique.    COMPARISON: None.    FINDINGS:     Intracranial contents: There is diffuse parenchymal volume loss.   White matter changes are present in the cerebral hemispheres that are  consistent with small vessel ischemic disease in this age patient.  There is low-density in the right insular cortex region. This is best  seen on axial images 16 and 17. This is consistent with a cortical  infarct. The age of this is uncertain. No hemorrhage or mass effect.    Visualized orbits/sinuses/mastoids:  The visualized portions of the  sinuses and mastoids appear normal.    Osseous structures/soft tissues:  There is no evidence of trauma.      Impression    IMPRESSION:   1. No intracranial hemorrhage.  2. Low dense area in the right insular cortex region. This is  consistent with an infarct. Age of this is uncertain. It could be  chronic.  3. There is diffuse parenchymal volume loss.  White matter changes are  present in the cerebral hemispheres that are consistent with small  vessel ischemic disease in this age patient.      CURTIS AVILA MD   CTA Head Neck with Contrast    Narrative    CT ANGIOGRAM OF THE HEAD AND NECK  8/3/2019 9:45 PM     HISTORY: Altered mental status. Code stroke    TECHNIQUE:  Precontrast localizing scans were followed by CT  angiography with an injection of 70 mL IV contrast with scans through  the head and neck.  Images were transferred to a separate 3-D  workstation where multiplanar reformations and 3-D images were  created.  Estimates of carotid stenoses are made relative to the  distal internal carotid artery diameters except as noted. Radiation  dose for this scan was reduced using automated exposure control,  adjustment of the mA and/or kV according to patient size, or iterative  reconstruction technique.    COMPARISON: None.    FINDINGS: Estimates of stenosis at the carotid bifurcations  are  relative to the distal internal carotids.    Arch: Calcified atherosclerotic plaque is seen in the arch of the  aorta. Extensive calcified atherosclerotic plaque is seen at the  origin of the right subclavian artery.    Neck:  Right Carotid:  The right common carotid artery is normal.  Calcified  atherosclerotic plaque is seen at the right carotid bifurcation. The  degree of stenosis is less than 50%..  The right internal carotid  artery is negative.     Left Carotid:  The left common carotid artery is unremarkable.   Calcified atherosclerotic plaque is seen at the left carotid  bifurcation. There is a severe stenosis at the bifurcation.  The left  internal carotid is negative.      Vertebrals:  The right vertebral artery is occluded proximally 2 cm  distal to the origin. There is collateral filling of the distal, V4  segment of the right vertebral artery. The left vertebral artery is a  large vessel and it supplies the basilar artery..    Head:  Right Carotid:No occluded vessels are seen. .    Left Carotid: There is a filling defect in the M1 segment of the left  middle cerebral artery consistent with thromboembolic disease. This is  causing a high-grade stenosis, near occlusion...  There is filling of  the inferior division of the left middle cerebral..    Basilar:  The basilar artery and its branches appear normal.     Miscellaneous: None      Impression    IMPRESSION:   1. Filling defect in the M1 segment of the left middle cerebral  leading to a high-grade stenosis. There is filling of inferior  division of the left middle cerebral.   2. High-grade stenosis at the left carotid bifurcation.  3. Mild stenosis right carotid bifurcation.  4. 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    Report was called to Dr. Gonzalez at 9:50 PM   XR Chest Port 1 View    Narrative    XR CHEST PORTABLE 1 VIEW   8/3/2019 10:45 PM     HISTORY: Post  intubation.    COMPARISON: None.    FINDINGS: Supine portable chest. ET tube in place with tip  approximately 3 cm above the thomas. NG tube tip is near the  gastroesophageal junction. No pneumothorax. The heart is enlarged.  There is no pulmonary edema. Thoracic aorta is calcified. Mild left  basilar atelectasis or scarring. The lungs are otherwise clear.      Impression    IMPRESSION: ET tube 3 cm above the thomas. NG tube tip near the  gastroesophageal junction.     Labs (personally reviewed/interpreted):  See a/p.    D/w Dr. Castano of neuro critical care.    Billing: This patient is critically ill: Yes. Total critical care time today 45 min.

## 2019-08-08 NOTE — PROGRESS NOTES
[facility] ICU RESPIRATORY NOTE        Date of Admission: 8/4/2019  Date of Intubation (most recent): 8/4/19  Reason for Mechanical Ventilation: Airway protection  Number of Days on Mechanical Ventilation: 5  Met Criteria for Pressure Support Trial: No  Reason for No Pressure Support Trial: Per MD    Significant Events Today: None    ABG Results:   Recent Labs   Lab 08/04/19  1117 08/04/19  0502 08/03/19  2200   O2PER 35% 35 percent  2L NC       Current Vent Settings: Ventilation Mode: SIMV/PS  (Synchronized Intermittent Mandatory Ventilation with Pressure Support)  FiO2 (%): 30 %  Rate Set (breaths/minute): 12 breaths/min  Tidal Volume Set (mL): 450 mL  PEEP (cm H2O): 5 cmH2O  Pressure Support (cm H2O): 5 cmH2O  Oxygen Concentration (%): 30 %  Resp: 23      Plan: Continue full ventilatory support    Jenelle Chavez, RRT

## 2019-08-08 NOTE — PLAN OF CARE
Neuro: Obtunded, withdraws in LUE/LLE, flexion in RUE/RLE, PERRLA but not tracking, roving eye movements, blinks to threat.      Respiratory: LS coarse, SIMV support overnight.      Cardiac: Tele is Afib with RVR and BBB     GI: BSx4, TF@45ml/hr with 90ml flushes     : lindo patent with adequate output     Skin: protective mepilex to sacrum over blancheable erythema.      Pain: CPOT negative     Mobility: Repositioned Q2hr         Family updated and involved in plan of care.

## 2019-08-08 NOTE — PROGRESS NOTES
"CM    I: GINA received request from RN CC to meet with patient's reported granddaughter, Karly, who had questions about placement/hospice, etc. As Karly is not listed as an emergency contact, GINA placed call to patient's spouse to obtain his permission to speak with her. Phone was answered by patient's son, Chan (also not listed as a contact) who stated spouse was outside in the yard processing all the information he had just learned about patient/trying to make a decision. Chan stated Karly is a niece in law and became upset \"all these people want information now\". GINA explained no patient information will be given to Karly without his father's permission. GINA attempted to meet with Karly to update her that permission had not been obtained to provide her any information, however, Karly was not on the ICU floor, in patient's room or the waiting area. GINA updated bedside RN. Should granddaughter return and want to meet with GINA, RN will call.  GINA would be able to provide generic discharge information to granddaughter, if requested.     P: Continue to assist as needed.    LEONARDA He  "

## 2019-08-08 NOTE — PHARMACY-VANCOMYCIN DOSING SERVICE
Pharmacy Vancomycin Initial Note  Date of Service 2019  Patient's  1935  84 year old, female    Indication: [facility] Pneumonia    Current estimated CrCl = Estimated Creatinine Clearance: 52.1 mL/min (based on SCr of 0.81 mg/dL).    Creatinine for last 3 days  2019:  4:50 AM Creatinine 0.77 mg/dL  2019:  5:40 AM Creatinine 0.86 mg/dL  2019:  5:13 AM Creatinine 0.81 mg/dL    Recent Vancomycin Level(s) for last 3 days  No results found for requested labs within last 72 hours.      Vancomycin IV Administrations (past 72 hours)      No vancomycin orders with administrations in past 72 hours.                Nephrotoxins and other renal medications (From now, onward)    Start     Dose/Rate Route Frequency Ordered Stop    19 0830  vancomycin 1500 mg in 0.9% NaCl 250 ml intermittent infusion 1,500 mg      1,500 mg  over 90 Minutes Intravenous EVERY 12 HOURS 19 0826      19 0815  furosemide (LASIX) injection 40 mg      40 mg  over 1-2 Minutes Intravenous EVERY 12 HOURS 19 0809 19 0814          Contrast Orders - past 72 hours (72h ago, onward)    Start     Dose/Rate Route Frequency Ordered Stop    19 0830  perflutren diluted 1mL to 2mL with saline (OPTISON) diluted injection 5 mL      5 mL Intravenous ONCE 19 0829 19 0830                Plan:  1.  Start vancomycin  1500 mg IV q12h.   2.  Goal Trough Level: 15-20 mg/L   3.  Pharmacy will check trough levels as appropriate in 1-3 Days.    4. Serum creatinine levels will be ordered daily for the first week of therapy and at least twice weekly for subsequent weeks.    5. Hysham method utilized to dose vancomycin therapy: Method 1    Vivian Mendes

## 2019-08-08 NOTE — PROGRESS NOTES
SPIRITUAL HE[facility] Progress Note  [facility] ICU    Pt's brother contacted ICU staff via phone and requested a visit from a hospital .  When I arrived, no family was present.  Pt was intubated, and opened eyes when I called her name.  I provided supportive presence and shared prayer with her.   team available for additional pt/family support per request.                                                                                                                                             Coby Ramirez M.A.  Staff   Pager [phone]  Phone [phone]

## 2019-08-08 NOTE — PROGRESS NOTES
[facility] ICU RESPIRATORY NOTE        Date of Admission: 8/4/2019    Date of Intubation (most recent): 8/4/2019    Reason for Mechanical Ventilation: Airway protection    Number of Days on Mechanical Ventilation: 5    Met Criteria for Pressure Support Trial: No    Reason for No Pressure Support Trial: Per MD    Significant Events Today: None    ABG Results:   Recent Labs   Lab 08/04/19  1117 08/04/19  0502 08/03/19  2200   O2PER 35% 35 percent  2L NC       Current Vent Settings: Ventilation Mode: SIMV/PS  (Synchronized Intermittent Mandatory Ventilation with Pressure Support)  FiO2 (%): 30 %  Rate Set (breaths/minute): 12 breaths/min  Tidal Volume Set (mL): 450 mL  PEEP (cm H2O): 5 cmH2O  Pressure Support (cm H2O): 5 cmH2O  Oxygen Concentration (%): 30 %  Resp: 26          Plan: continue ventilatory support and assess daily for ability to wean.    Declan Johns

## 2019-08-08 NOTE — PROGRESS NOTES
[facility] ICU RESPIRATORY NOTE        Date of Admission: 8/4/2019    Date of Intubation (most recent):8/4/2019    Reason for Mechanical Ventilation:Airway protection    Number of Days on Mechanical Ventilation:4    Met Criteria for Pressure Support Trial:No    Reason for No Pressure Support Trial:Per MD    Significant Events Today:None    ABG Results:   Recent Labs   Lab 08/04/19  1117 08/04/19  0502 08/03/19  2200   O2PER 35% 35 percent  2L NC       Current Vent Settings: Ventilation Mode: SIMV/PS  (Synchronized Intermittent Mandatory Ventilation with Pressure Support)  FiO2 (%): 30 %  Rate Set (breaths/minute): 12 breaths/min  Tidal Volume Set (mL): 450 mL  PEEP (cm H2O): 5 cmH2O  Pressure Support (cm H2O): 5 cmH2O  Oxygen Concentration (%): 30 %  Resp: 27      Plan:Will cont full vent support for now and will assess for weaning readiness daily.    Gali Barrientos RRT

## 2019-08-08 NOTE — PROGRESS NOTES
"Stroke Service Progress Note  Lying in bed. Appears comfortable. No overnight events.    08/08/2019     Problem List:  Patient Active Problem List   Diagnosis     Acute CVA (cerebrovascular accident) (H)     Benign essential hypertension     Carotid stenosis     Chronic kidney disease (CKD) stage G3a/A2, moderately decreased glomerular filtration rate (GFR) between 45-59 mL/min/1.73 square meter and albuminuria creatinine ratio between  mg/g (H)     Diabetes mellitus, type II (H)     Acute ischemic right middle cerebral artery (MCA) stroke (H)     Giant cell arteritis (H)     GERD (gastroesophageal reflux disease)     Hypothyroidism     Mixed hyperlipidemia     Paroxysmal atrial fibrillation (H)     Back pain without radiation       Current Medications:    aspirin  81 mg Oral Daily     ceFEPIme (MAXIPIME) IV  1 g Intravenous Q24H     chlorhexidine  15 mL Mouth/Throat Q12H     ezetimibe  10 mg Oral Daily     folic acid  1 mg Oral Daily     furosemide  40 mg Intravenous Q12H     heparin  5,000 Units Subcutaneous Q8H     levothyroxine  75 mcg Oral QAM AC     metoprolol  75 mg Oral BID     modafinil  200 mg Oral or Feeding Tube Daily     pantoprazole  40 mg Oral QAM AC     timolol maleate  1 drop Both Eyes BID     vancomycin (VANCOCIN) IV  1,500 mg Intravenous Q12H       Infusions:    sodium chloride 0.9 %       IV fluid REPLACEMENT ONLY       insulin (regular) 11 Units/hr (08/08/19 0842)     niCARdipine 40 mg in 200 mL 0.9% NaCl Stopped (08/05/19 0949)     propofol (DIPRIVAN) infusion Stopped (08/04/19 0945)     sodium chloride 10 mL/hr at 08/07/19 2342     Physical Examination:   BP (!) 168/125   Pulse 115   Temp 99.7  F (37.6  C) (Axillary)   Resp 25   Ht 1.626 m (5' 4.02\")   Wt 77.5 kg (170 lb 13.7 oz)   SpO2 96%   BMI 29.31 kg/m      Exam:  Neurologic: On exam, she remains intubated, without effort to interact with examiners. Eyes open rarely but spontaneously. Equivocal blink to threat bilaterally.  " "Gaze preference to left. Symmetric pupils, 2-3 with proper response to light. Actively attempts to hold eyes closed during examination.  No obvious facial asymmetry.  No response to noxious stimulus in the right arm, no spontaneous movements in the right arm, triple flexion in the right leg, withdrawal in the left leg, localization and left arm.  Diffuse hyperreflexia with clonus in the left arm.  Upgoing plantar response bilaterally.  Irregular cardiac rhythm.  Mechanically ventilated.  Moderate edema throughout.  Scattered ecchymoses.    National [employer] Stroke Scale  Exam Interval: Day 5   Score    Level of consciousness: (2)   Not alert; repeat stim to attend strong stim/pain to move    LOC questions: (2)   Answers neither question correctly    LOC commands: (2)   Performs neither task correctly    Best gaze: (1)   Partial gaze palsy    Visual: (0)   No visual loss    Facial palsy: (0)   Normal symmetrical movements    Motor arm (left): (2)   Some effort against gravity    Motor arm (right): (4)   No movement    Motor leg (left): (3)   No effort against gravity    Motor leg (right): (3)   No effort against gravity    Limb ataxia: (0)   Absent    Sensory: (0)   Normal- no sensory loss    Best language: (3)   Mute- global aphasia    Dysarthria: UN Intubated or other physical barrier: intubated    Extinction and inattention: (0)   No abnormality        Total Score:  22     Labs/Studies: Imaging results copied directly to ensure accuracy  Lab Results   Component Value Date    A1C 7.1 08/04/2019     LDL checked during recent admission 58 on 7/26    MRI:   \"IMPRESSION:  1.  Multiple areas of restricted diffusion in the right and left  frontal lobes, the left basal ganglia, and in the cortex of the left  parietal lobe. These are consistent with evolving infarcts.   2. There are susceptibility hypointensities in both the right and left  frontal lobes and left basal ganglia. This is likely due to " "petechial  hemorrhage. This corresponds to the areas of high density seen on the  previous head CT.  3. Mild mass effect on the left lateral ventricle, but no significant  shift of midline structures.\"    CTH follow-up:  \"IMPRESSION: Evolving bilateral middle cerebral artery ischemic  infarcts. No evidence for any hemorrhagic transformation or  significant mass effect.\"    Echo:  \"Interpretation Summary     A contrast injection (Bubble Study) was performed that was negative for flow  across the interatrial septum.  The visual ejection fraction is estimated at 50-55%.  Left ventricular systolic function is low normal.  There is mild to moderate (1-2+) mitral regurgitation.  There is mild to moderate (1-2+) tricuspid regurgitation.  Right ventricular systolic pressure is elevated, consistent with mild to  moderate pulmonary hypertension.  The rhythm was rapid atrial fibrillation.  The study was technically difficult. The study was technically limited.\"    ASSESSMENT/PLAN:   Ms Chan is an 84-year-old woman with history of atrial fibrillation (previously on apixaban) currently admitted to the ICU for treatment of now bilateral MCA territory strokes. Neurologic exam remains poor, but stable. Family meeting yesterday discussed predicted poor prognosis for substantial neurologic recovery. Family mostly in agreement that such disability would not be compatible with her desired quality of life. Ongoing discussions regarding goals of care in place.    #Bilateral MCA territory infarctions, status post thrombectomy x2 and alteplase x1  - Systolic blood pressure goal less than 180  - Neurochecks every 2 hours  - Holding anticoagulation for now in setting of large stroke burden  - aspirin 81mg daily until anticoagulation can be resumed  - Please avoid use of hypotonic solutions, such as D5 or half-normal saline, as they worsen cerebral edema  - modafinil 200mg daily  - hydration and nutrition via feeding tube  - Head of bed " "elevated 30 degrees  - goal normonatremia, but will permit hypernatremia  -Telemetry  -Stroke education  -PT/OT/speech therapy when appropriate    =========================================================    This patient was seen & discussed with attending neurologist, Dr. Rivero.     Mario Castano,   PGY5 Neurocritical Care Fellow    08/08/2019 8:44 AM  Text Page (8am-7pm)  To page stroke neurology after hours or on a subsequent day, click here: [facility]  Choose \"On Call\" tab at top, then search dropdown box for \"Neurology Adult\" & press Enter, look for Neuro ICU/Stroke          "

## 2019-08-08 NOTE — PROGRESS NOTES
Critical Care  Note      08/08/2019    Name: Nessa Chan MRN#: [id number]   Age: 84 year old YOB: 1935     Hsptl Day# 4  ICU DAY #4    MV DAY #4             Problem List:   Acute ischemic stroke  Loss of protective airway reflexes         Summary/Hospital Course:     Pt intubated/sedated, unable to obtain histories directly. No events overnight.  Still remains unresponsive off sedation.  Family still visiting and saying goodbye; anticipate extubation to comfort measures in coming days.    84F presented to OSH after being found down and then becoming encephalopathic at her TCU.  Noted to have a leftward gaze and unresponsive.  CT scan at OSH revealed a R insular infarct, followup CTA revealed a left MCA M1 segment high grade stenosis as well as high grade stenosis at the L bifurcation.  Intubated there for airway protection, then tpa given and transferred here where penumbra mechanical intervention was performed.  Has remained intubated with depressed consciousness.       Assessment and plan :     Nessa Chan IS a 84 year old female admitted on 8/4/2019 for acute cva.   I have personally reviewed the daily labs, imaging studies, cultures and discussed the case with referring physician and consulting physicians.     My assessment and plan by system for this patient is as follows:    Neurology/Psychiatry:   1. Acute cva:  Appreciate neuro and IR efforts.  S/p aspiration with penumbra device.  Neuro checks and followup imaging per neuro direction.  ASA and plavix per neuro direction, now restarted on ASA.  2.  Sedation:  Holding for now to best assess mental status.  Propofol/prn midazolam if needed.  3.  Analgesia:  Prn dilaudid    Cardiovascular:   1. HLD:  Home zetia   2.  HTN:  Nicardipine drip per neuro with sbp goal 180, not currently needing   3.  Paroxysmal AF: Rate control as needed with metoprolol, continue home metoprolol at 75 bid.  Hold AC for now in setting of large acute cva and  tpa.    Pulmonary/Ventilator Management:   1. Loss of airway protective reflexes:  In setting of cva noted above.  Intubated/ventilated.    GI and Nutrition :   1.  Non-Severe malnutrition in context of acute illness or injury:  Continue tube feeds.  2.  PPI for pud prophy    Renal/Fluids/Electrolytes:   1. Creat 0.86 stable  2.  +3L since admission.  Diuresis today x2 doses.    Infectious Disease:   1. Appears to have UTI on UA (present on admission). On cefepime.  2.  Staph in sputum culture.  Continue cefepime.  Start vanco for now.  2.  Leukocytosis:  Improving on broadened abx.     Endocrine:   1. Hypothyroid:  Continue home levothyroxine  2.  Glucose control as needed.    Hematology/Oncology:   1. Wbc downtrending to 16  2.  Hbg ok 10.6  3.  plttls ok 292    ICU Prophylaxis:   1. DVT: subcut heparin  2. VAP: HOB 30 degrees, chlorhexidine rinse  3. Stress Ulcer: PPI  4. Restraints: Nonviolent soft two point restraints required and necessary for patient safety and continued cares and good effect as patient continues to pull at necessary lines, tubes despite education and distraction. Will readdress daily.   5. Wound care  -   6. Feeding - tf  7. Disposition - icu    [Family mtg 8/7/2019:  Held with numerous family members including pt's , adult children and grandchildren, and neuro and palliative care teams.  Discussed her condition and overall poor neurological prognosis, as well as the fact that she unlikely would ever maintain a sustainable state of health off of a ventilator, and she unlikely would ever regain a sufficiently functional status to return home.  They were understanding of this and seem to be moving toward a comfort-based approach.  Transitioned to DNR status.  All questions asked and answered.]         Medical History:     [facility] ([facility] dischg summary 7/25):  Temporal arteritis  Carotid stenosis bilateral  Depression  Diabetes  Prior GIB while on  warfarin  Glaucoma  HLD  HTN  hypoNa  Hypothyroid  Paroxysmal AF  Osteoporosis  ckd    PSH ([facility] 7/25):  Temporal artery biopsy    Home Meds (per [facility] discharge summary 7/25):    Tylenol  Amlodipine 10 daily  zetia 10 daily  Fenofibrate 160 daily  Folate 1 mg daily  Levothyroxine 75 daily  Metoprolol 75 bid  prilosec 20 daily  zoloft 100 daily  Timolol optic 1 drop both eyes bid  b12 1000 mcg daily  ?Eliquis?       Allergies   Allergen Reactions     Ciprofloxacin      Other reaction(s): Dizziness     Hmg-Coa-R Inhibitors      Myalgias. Zocor, Crestor, Mevacor, Lipitor.        Social hx:  Unable, intubated/sedated.  Fam hs:  Unable, intubated/sedated  ROS: unable, intubated/sedated.           Physical Examination:   Temp:  [99.7  F (37.6  C)-100.2  F (37.9  C)] 99.7  F (37.6  C)  Pulse:  [] 115  Heart Rate:  [] 120  Resp:  [14-51] 25  BP: (110-176)/() 168/125  FiO2 (%):  [30 %-35 %] 30 %  SpO2:  [95 %-99 %] 96 %      Intake/Output Summary (Last 24 hours) at 8/8/2019 0819  Last data filed at 8/8/2019 0600  Gross per 24 hour   Intake 1597.3 ml   Output 1360 ml   Net 237.3 ml         Wt Readings from Last 4 Encounters:   08/07/19 77.5 kg (170 lb 13.7 oz)   08/03/19 70.3 kg (155 lb)     BP - Mean:  [] 143  Ventilation Mode: SIMV/PS  (Synchronized Intermittent Mandatory Ventilation with Pressure Support)  FiO2 (%): 30 %  Rate Set (breaths/minute): 12 breaths/min  Tidal Volume Set (mL): 450 mL  PEEP (cm H2O): 5 cmH2O  Pressure Support (cm H2O): 5 cmH2O  Oxygen Concentration (%): 30 %  Resp: 25    Recent Labs   Lab 08/04/19  1117 08/04/19  0502 08/03/19  2200   O2PER 35% 35 percent  2L NC       GEN: no acute distress, intubated  HEENT: head ncat, sclera anicteric, OP patent, trachea midline   PULM: unlabored synchronous with vent, clear anteriorly    CV/COR: RRR S1S2 no gallop,  No rub, no murmur  ABD: soft nontender, hypoactive bowel sounds, no mass  EXT:   Warm x4, no  edema  NEURO: unresponsive off sedation, perrl, triple flex resolved today, upgoing toes less pronounced, tone improved.    SKIN: no obvious rash  LINES: clean, dry intact         Data:   All data and imaging reviewed     ROUTINE ICU LABS (Last four results)  CMP  Recent Labs   Lab 08/08/19  0513 08/07/19  0540 08/06/19  0450 08/05/19  0538  08/03/19  2123    143 147* 150*   < > 141   POTASSIUM 3.7 3.3* 3.4 3.5   < > 3.4   CHLORIDE 110* 110* 115* 117*   < > 109   CO2 28 25 25 24   < > 25   ANIONGAP 5 8 7 9   < > 7   * 108* 154* 187*   < > 236*   BUN 19 13 19 19   < > 26   CR 0.81 0.86 0.77 0.91   < > 1.06*   GFRESTIMATED 66 62 71 58*   < > 48*   GFRESTBLACK 77 72 82 67   < > 56*   KADEEM 8.6 8.4* 8.6 8.6   < > 9.2   MAG 2.0 2.0 1.9 2.2   < >  --    PHOS 2.8 3.3 2.4* 1.9*   < >  --    PROTTOTAL  --   --   --   --   --  6.8   ALBUMIN  --   --   --   --   --  3.1*   BILITOTAL  --   --   --   --   --  0.5   ALKPHOS  --   --   --   --   --  60   AST  --   --   --   --   --  30   ALT  --   --   --   --   --  18    < > = values in this interval not displayed.     CBC  Recent Labs   Lab 08/08/19  0513 08/07/19  0540 08/06/19 0450 08/05/19  0538   WBC 15.8* 23.0* 18.5* 16.4*   RBC 3.78* 3.73* 3.64* 3.65*   HGB 10.6* 10.6* 10.4* 10.3*   HCT 34.3* 34.0* 33.4* 33.4*   MCV 91 91 92 92   MCH 28.0 28.4 28.6 28.2   MCHC 30.9* 31.2* 31.1* 30.8*   RDW 16.5* 16.8* 16.9* 16.6*    298 300 300     INR  Recent Labs   Lab 08/03/19  2123   INR 1.09     Arterial Blood Gas  Recent Labs   Lab 08/04/19  1117 08/04/19  0502 08/03/19  2200   O2PER 35% 35 percent  2L NC       All cultures:  Recent Labs   Lab 08/06/19  0845 08/03/19  2204   CULT Moderate growth  Yeast  *  Moderate growth  Staphylococcus species  * >100,000 colonies/mL  Enterococcus faecalis  *  >100,000 colonies/mL  Hafnia alvei  *  Susceptibility testing requested by  Dr. Espitia for Meropenem on Hafnia alvei 8.7.19 @0845        Recent Results (from the past  24 hour(s))   CT Head w/o Contrast    Narrative    CT SCAN OF THE HEAD WITHOUT CONTRAST   8/3/2019 9:35 PM     HISTORY: Altered mental status. (LOC), unexplained    TECHNIQUE:  Axial images of the head and coronal reformations without  IV contrast material. Radiation dose for this scan was reduced using  automated exposure control, adjustment of the mA and/or kV according  to patient size, or iterative reconstruction technique.    COMPARISON: None.    FINDINGS:     Intracranial contents: There is diffuse parenchymal volume loss.   White matter changes are present in the cerebral hemispheres that are  consistent with small vessel ischemic disease in this age patient.  There is low-density in the right insular cortex region. This is best  seen on axial images 16 and 17. This is consistent with a cortical  infarct. The age of this is uncertain. No hemorrhage or mass effect.    Visualized orbits/sinuses/mastoids:  The visualized portions of the  sinuses and mastoids appear normal.    Osseous structures/soft tissues:  There is no evidence of trauma.      Impression    IMPRESSION:   1. No intracranial hemorrhage.  2. Low dense area in the right insular cortex region. This is  consistent with an infarct. Age of this is uncertain. It could be  chronic.  3. There is diffuse parenchymal volume loss.  White matter changes are  present in the cerebral hemispheres that are consistent with small  vessel ischemic disease in this age patient.      CURTIS AVILA MD   CTA Head Neck with Contrast    Narrative    CT ANGIOGRAM OF THE HEAD AND NECK  8/3/2019 9:45 PM     HISTORY: Altered mental status. Code stroke    TECHNIQUE:  Precontrast localizing scans were followed by CT  angiography with an injection of 70 mL IV contrast with scans through  the head and neck.  Images were transferred to a separate 3-D  workstation where multiplanar reformations and 3-D images were  created.  Estimates of carotid stenoses are made relative to  the  distal internal carotid artery diameters except as noted. Radiation  dose for this scan was reduced using automated exposure control,  adjustment of the mA and/or kV according to patient size, or iterative  reconstruction technique.    COMPARISON: None.    FINDINGS: Estimates of stenosis at the carotid bifurcations are  relative to the distal internal carotids.    Arch: Calcified atherosclerotic plaque is seen in the arch of the  aorta. Extensive calcified atherosclerotic plaque is seen at the  origin of the right subclavian artery.    Neck:  Right Carotid:  The right common carotid artery is normal.  Calcified  atherosclerotic plaque is seen at the right carotid bifurcation. The  degree of stenosis is less than 50%..  The right internal carotid  artery is negative.     Left Carotid:  The left common carotid artery is unremarkable.   Calcified atherosclerotic plaque is seen at the left carotid  bifurcation. There is a severe stenosis at the bifurcation.  The left  internal carotid is negative.      Vertebrals:  The right vertebral artery is occluded proximally 2 cm  distal to the origin. There is collateral filling of the distal, V4  segment of the right vertebral artery. The left vertebral artery is a  large vessel and it supplies the basilar artery..    Head:  Right Carotid:No occluded vessels are seen. .    Left Carotid: There is a filling defect in the M1 segment of the left  middle cerebral artery consistent with thromboembolic disease. This is  causing a high-grade stenosis, near occlusion...  There is filling of  the inferior division of the left middle cerebral..    Basilar:  The basilar artery and its branches appear normal.     Miscellaneous: None      Impression    IMPRESSION:   1. Filling defect in the M1 segment of the left middle cerebral  leading to a high-grade stenosis. There is filling of inferior  division of the left middle cerebral.   2. High-grade stenosis at the left carotid  bifurcation.  3. Mild stenosis right carotid bifurcation.  4. 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    Report was called to Dr. Gonzalez at 9:50 PM   XR Chest Port 1 View    Narrative    XR CHEST PORTABLE 1 VIEW   8/3/2019 10:45 PM     HISTORY: Post intubation.    COMPARISON: None.    FINDINGS: Supine portable chest. ET tube in place with tip  approximately 3 cm above the thomas. NG tube tip is near the  gastroesophageal junction. No pneumothorax. The heart is enlarged.  There is no pulmonary edema. Thoracic aorta is calcified. Mild left  basilar atelectasis or scarring. The lungs are otherwise clear.      Impression    IMPRESSION: ET tube 3 cm above the thomas. NG tube tip near the  gastroesophageal junction.     Labs (personally reviewed/interpreted):  See a/p.    Billing: This patient is critically ill: Yes. Total critical care time today 35 min.

## 2019-08-09 NOTE — PROGRESS NOTES
"Stroke Service Progress Note  Lying in bed, ventilated. Rare spontaneous movements.    08/09/2019     Problem List:  Patient Active Problem List   Diagnosis     Acute CVA (cerebrovascular accident) (H)     Benign essential hypertension     Carotid stenosis     Chronic kidney disease (CKD) stage G3a/A2, moderately decreased glomerular filtration rate (GFR) between 45-59 mL/min/1.73 square meter and albuminuria creatinine ratio between  mg/g (H)     Diabetes mellitus, type II (H)     Acute ischemic right middle cerebral artery (MCA) stroke (H)     Giant cell arteritis (H)     GERD (gastroesophageal reflux disease)     Hypothyroidism     Mixed hyperlipidemia     Paroxysmal atrial fibrillation (H)     Back pain without radiation       Current Medications:    aspirin  81 mg Oral Daily     ceFEPIme (MAXIPIME) IV  1 g Intravenous Q24H     chlorhexidine  15 mL Mouth/Throat Q12H     ezetimibe  10 mg Oral Daily     folic acid  1 mg Oral Daily     heparin  5,000 Units Subcutaneous Q8H     levothyroxine  75 mcg Oral QAM AC     metoprolol  75 mg Oral BID     modafinil  200 mg Oral or Feeding Tube Daily     pantoprazole  40 mg Oral QAM AC     timolol maleate  1 drop Both Eyes BID     vancomycin (VANCOCIN) IV  1,500 mg Intravenous Q12H       Infusions:    sodium chloride 0.9 %       IV fluid REPLACEMENT ONLY       insulin (regular) 4 Units/hr (08/09/19 0929)     niCARdipine 40 mg in 200 mL 0.9% NaCl Stopped (08/05/19 0949)     propofol (DIPRIVAN) infusion Stopped (08/04/19 0945)     sodium chloride 10 mL/hr at 08/09/19 0755     Physical Examination:   BP (!) 140/95   Pulse 98   Temp 99  F (37.2  C) (Axillary)   Resp 22   Ht 1.626 m (5' 4.02\")   Wt 77.5 kg (170 lb 13.7 oz)   SpO2 99%   BMI 29.31 kg/m      Exam:  Neurologic: On exam, Ms Chan remains intubated. She is not sedated. Her eyes occasionally open spontaneously.  She does not make any purposeful efforts to track examiners.  She does not follow any commands.  " "There is a left gaze preference that  occasionally crosses midline.  Pupils are symmetric, 3 mm and react to light.  No clear facial asymmetry.  Appropriate withdrawal to noxious stimulus in the left arm and leg.  No movement in the right arm.  Triple flexion in the right leg.  Tendon reflexes are diffusely brisk with clonus in the left arm.  Irregular heart rhythm.  Mechanically ventilated.  Peripheral edema present in all 4 limbs.    National Institutes of [employer] Stroke Scale  Exam Interval: Day 6   Score    Level of consciousness: (2)   Not alert; repeat stim to attend strong stim/pain to move    LOC questions: (2)   Answers neither question correctly    LOC commands: (2)   Performs neither task correctly    Best gaze: (1)   Partial gaze palsy    Visual: (0)   No visual loss    Facial palsy: (0)   Normal symmetrical movements    Motor arm (left): (2)   Some effort against gravity    Motor arm (right): (4)   No movement    Motor leg (left): (3)   No effort against gravity    Motor leg (right): (3)   No effort against gravity    Limb ataxia: (0)   Absent    Sensory: (0)   Normal- no sensory loss    Best language: (3)   Mute- global aphasia    Dysarthria: UN Intubated or other physical barrier: intubated    Extinction and inattention: (0)   No abnormality        Total Score:  22     Labs/Studies: Imaging results copied directly to ensure accuracy  Lab Results   Component Value Date    A1C 7.1 08/04/2019     LDL checked during recent admission 58 on 7/26    MRI:   \"IMPRESSION:  1.  Multiple areas of restricted diffusion in the right and left  frontal lobes, the left basal ganglia, and in the cortex of the left  parietal lobe. These are consistent with evolving infarcts.   2. There are susceptibility hypointensities in both the right and left  frontal lobes and left basal ganglia. This is likely due to petechial  hemorrhage. This corresponds to the areas of high density seen on the  previous head CT.  3. Mild mass " "effect on the left lateral ventricle, but no significant  shift of midline structures.\"    CTH follow-up:  \"IMPRESSION: Evolving bilateral middle cerebral artery ischemic  infarcts. No evidence for any hemorrhagic transformation or  significant mass effect.\"    Echo:  \"Interpretation Summary     A contrast injection (Bubble Study) was performed that was negative for flow  across the interatrial septum.  The visual ejection fraction is estimated at 50-55%.  Left ventricular systolic function is low normal.  There is mild to moderate (1-2+) mitral regurgitation.  There is mild to moderate (1-2+) tricuspid regurgitation.  Right ventricular systolic pressure is elevated, consistent with mild to  moderate pulmonary hypertension.  The rhythm was rapid atrial fibrillation.  The study was technically difficult. The study was technically limited.\"    ASSESSMENT/PLAN:   Ms Chan is an 84-year-old woman with history of atrial fibrillation (previously on apixaban) currently admitted to the ICU for treatment of now bilateral MCA territory strokes. No significant improvement in neurological examination. Plan for family conference today at 1300 per RN.    #Bilateral MCA territory infarctions, status post thrombectomy x2 and alteplase x1  - Systolic blood pressure goal less than 180  - Neurochecks every 4 hours  - Holding anticoagulation for now in setting of large stroke burden  - aspirin 81mg daily until anticoagulation can be resumed  - Please avoid use of hypotonic solutions, such as D5 or half-normal saline, as they worsen cerebral edema  - modafinil 200mg daily  - hydration and nutrition via feeding tube  - Head of bed elevated 30 degrees  - goal normonatremia, but will permit hypernatremia  -Telemetry  -Stroke education  -PT/OT/speech therapy when appropriate  - family meeting today    =========================================================    This patient was seen & discussed with attending neurologist, Dr. Rivero. " "    Mario Castano DO  PGY5 Neurocritical Care Fellow    08/09/2019 10:20 AM  Text Page (8am-7pm)  To page stroke neurology after hours or on a subsequent day, click here: [facility]  Choose \"On Call\" tab at top, then search dropdown box for \"Neurology Adult\" & press Enter, look for Neuro ICU/Stroke          "

## 2019-08-09 NOTE — PROGRESS NOTES
[facility] ICU RESPIRATORY NOTE           Date of Admission: 8/4/2019  Date of Intubation (most recent): 8/4/19  Reason for Mechanical Ventilation: Airway protection  Number of Days on Mechanical Ventilation: 6  Met Criteria for Pressure Support Trial: No  Reason for No Pressure Support Trial: Per MD     Significant Events Today: None overnight     ABG Results:   Recent Labs   Lab 08/04/19  1117 08/04/19  0502 08/03/19  2200   O2PER 35% 35 percent  2L NC     Ventilation Mode: SIMV/PS  (Synchronized Intermittent Mandatory Ventilation with Pressure Support)  FiO2 (%): 30 %  Rate Set (breaths/minute): 12 breaths/min  Tidal Volume Set (mL): 450 mL  PEEP (cm H2O): 5 cmH2O  Pressure Support (cm H2O): 5 cmH2O  Oxygen Concentration (%): 30 %  Resp: 28    Will continue to follow.     Aquilino Jose RT

## 2019-08-09 NOTE — PROGRESS NOTES
Intensivist:  D/w Dr. Lal of hospitalist service.  They will assume care tomorrow morning and are aware of her in case overnight transfer out of ICU is necessary.

## 2019-08-09 NOTE — PROGRESS NOTES
Brief Follow-up Note    Discussion held with multiple family members, including Nessa's . The family was in clear agreement that the predicted level of disability she will likely experience as a result of her strokes is not compatible with an acceptable quality of life for her. The family has asked that the focus of her cares be transitioned to comfort measures only. Orders updated. Plan for palliative extubation this afternoon. Discussed with Dr Rivero and Dr Santana.    Mario Castano DO  PGY5 Neurocritical Care Fellow

## 2019-08-09 NOTE — PROGRESS NOTES
Extubated at 1650 without difficulties. L gaze preference. Not following. No neuro changes. On comfort care now. LS coarse. On 2L via NC. A. Fib w/ RVR. LUE purposeful movements. Withdraws on RUE and BLE. Generalized bruising throughout the body. Redness on labia and hood-anal area. L cheek redness from ETT montiel. PIV x2 SL. Family updated at bedside on comfort care overnight. Will continue to monitor.     Partial denture and two rings sent with pt's daughter, Sharon.

## 2019-08-09 NOTE — PROGRESS NOTES
Critical Care  Note      08/09/2019    Name: Nessa Chan MRN#: [id number]   Age: 84 year old YOB: 1935     Hsptl Day# 5  ICU DAY #5    MV DAY #5             Problem List:   Acute ischemic stroke  Loss of protective airway reflexes         Summary/Hospital Course:     Pt intubated/sedated, unable to obtain histories directly. No events overnight.  Still remains unresponsive off sedation.  Family still visiting and saying goodbye; anticipate possible extubation to comfort measures in coming days.    84F presented to OSH after being found down and then becoming encephalopathic at her TCU.  Noted to have a leftward gaze and unresponsive.  CT scan at OSH revealed a R insular infarct, followup CTA revealed a left MCA M1 segment high grade stenosis as well as high grade stenosis at the L bifurcation.  Intubated there for airway protection, then tpa given and transferred here where penumbra mechanical intervention was performed.  Has remained intubated with depressed consciousness.       Assessment and plan :     Nessa Chan IS a 84 year old female admitted on 8/4/2019 for acute cva.   I have personally reviewed the daily labs, imaging studies, cultures and discussed the case with referring physician and consulting physicians.     My assessment and plan by system for this patient is as follows:    Neurology/Psychiatry:   1. Acute cva:  Appreciate neuro and IR efforts.  S/p aspiration with penumbra device.  Neuro checks and followup imaging per neuro direction.  ASA and plavix per neuro direction, now on ASA.  2.  Sedation:  Holding for now to best assess mental status.  Propofol/prn midazolam if needed.  3.  Analgesia:  Prn dilaudid    Cardiovascular:   1. HLD:  Home zetia   2.  HTN:  Nicardipine drip per neuro with sbp goal 180, not currently needing   3.  Paroxysmal AF: Rate control as needed with metoprolol, continue home metoprolol at 75 bid.  Hold AC for now in setting of large acute cva and  tpa.    Pulmonary/Ventilator Management:   1. Loss of airway protective reflexes:  In setting of cva noted above.  Intubated/ventilated.    GI and Nutrition :   1.  Non-Severe malnutrition in context of acute illness or injury:  Continue tube feeds.  2.  PPI for pud prophy    Renal/Fluids/Electrolytes:   1. Creat 0.84 stable  2.  +3L since admission.  Diuresis again today x2 doses.    Infectious Disease:   1. Appears to have UTI on UA (present on admission). On cefepime.  2.  Staph in sputum culture--coag negative.  Suspect this is non-pathogenic.  Stop vanco.  2.  Leukocytosis:  Improving on broadened abx.     Endocrine:   1. Hypothyroid:  Continue home levothyroxine  2.  Glucose control as needed.    Hematology/Oncology:   1. Wbc downtrending to 12  2.  Hbg ok 11  3.  plttls ok 306    ICU Prophylaxis:   1. DVT: subcut heparin  2. VAP: HOB 30 degrees, chlorhexidine rinse  3. Stress Ulcer: PPI  4. Restraints: Nonviolent soft two point restraints required and necessary for patient safety and continued cares and good effect as patient continues to pull at necessary lines, tubes despite education and distraction. Will readdress daily.   5. Wound care  -   6. Feeding - tf  7. Disposition - icu    [Family mtg 8/7/2019:  Held with numerous family members including pt's , adult children and grandchildren, and neuro and palliative care teams.  Discussed her condition and overall poor neurological prognosis, as well as the fact that she unlikely would ever maintain a sustainable state of health off of a ventilator, and she unlikely would ever regain a sufficiently functional status to return home.  They were understanding of this and seem to be moving toward a comfort-based approach.  Transitioned to DNR status.  All questions asked and answered.]         Medical History:     [facility] ([facility] dischg summary 7/25):  Temporal arteritis  Carotid stenosis bilateral  Depression  Diabetes  Prior GIB while on  warfarin  Glaucoma  HLD  HTN  hypoNa  Hypothyroid  Paroxysmal AF  Osteoporosis  ckd    PSH ([facility] 7/25):  Temporal artery biopsy    Home Meds (per [facility] discharge summary 7/25):    Tylenol  Amlodipine 10 daily  zetia 10 daily  Fenofibrate 160 daily  Folate 1 mg daily  Levothyroxine 75 daily  Metoprolol 75 bid  prilosec 20 daily  zoloft 100 daily  Timolol optic 1 drop both eyes bid  b12 1000 mcg daily  ?Eliquis?       Allergies   Allergen Reactions     Ciprofloxacin      Other reaction(s): Dizziness     Hmg-Coa-R Inhibitors      Myalgias. Zocor, Crestor, Mevacor, Lipitor.        Social hx:  Unable, intubated/sedated.  Fam hs:  Unable, intubated/sedated  ROS: unable, intubated/sedated.           Physical Examination:   Temp:  [98  F (36.7  C)-100  F (37.8  C)] 99  F (37.2  C)  Pulse:  [] 94  Heart Rate:  [] 103  Resp:  [17-28] 21  BP: ()/() 119/77  FiO2 (%):  [30 %] 30 %  SpO2:  [96 %-100 %] 99 %      Intake/Output Summary (Last 24 hours) at 8/9/2019 1243  Last data filed at 8/9/2019 1000  Gross per 24 hour   Intake 1934.55 ml   Output 1435 ml   Net 499.55 ml         Wt Readings from Last 4 Encounters:   08/07/19 77.5 kg (170 lb 13.7 oz)   08/03/19 70.3 kg (155 lb)     BP - Mean:  [] 95  Ventilation Mode: SIMV/PS  (Synchronized Intermittent Mandatory Ventilation with Pressure Support)  FiO2 (%): 30 %  Rate Set (breaths/minute): 12 breaths/min  Tidal Volume Set (mL): 450 mL  PEEP (cm H2O): 5 cmH2O  Pressure Support (cm H2O): 5 cmH2O  Oxygen Concentration (%): 30 %  Resp: 21    Recent Labs   Lab 08/04/19  1117 08/04/19  0502 08/03/19  2200   O2PER 35% 35 percent  2L NC       GEN: no acute distress, intubated  HEENT: head ncat, sclera anicteric, OP patent, trachea midline   PULM: unlabored synchronous with vent, clear anteriorly    CV/COR: RRR S1S2 no gallop,  No rub, no murmur  ABD: soft nontender, hypoactive bowel sounds, no mass  EXT:   Warm x4, no edema  NEURO:  unresponsive off sedation, perrl, upgoing toes more pronounced again.    SKIN: no obvious rash  LINES: clean, dry intact         Data:   All data and imaging reviewed     ROUTINE ICU LABS (Last four results)  CMP  Recent Labs   Lab 08/09/19 0458 08/08/19 0513 08/07/19  0540 08/06/19 0450 08/03/19  2123    143 143 147*   < > 141   POTASSIUM 3.6 3.7 3.3* 3.4   < > 3.4   CHLORIDE 109 110* 110* 115*   < > 109   CO2 28 28 25 25   < > 25   ANIONGAP 7 5 8 7   < > 7   * 155* 108* 154*   < > 236*   BUN 21 19 13 19   < > 26   CR 0.84 0.81 0.86 0.77   < > 1.06*   GFRESTIMATED 64 66 62 71   < > 48*   GFRESTBLACK 74 77 72 82   < > 56*   KADEEM 8.9 8.6 8.4* 8.6   < > 9.2   MAG 2.0 2.0 2.0 1.9   < >  --    PHOS 2.8 2.8 3.3 2.4*   < >  --    PROTTOTAL  --   --   --   --   --  6.8   ALBUMIN  --   --   --   --   --  3.1*   BILITOTAL  --   --   --   --   --  0.5   ALKPHOS  --   --   --   --   --  60   AST  --   --   --   --   --  30   ALT  --   --   --   --   --  18    < > = values in this interval not displayed.     CBC  Recent Labs   Lab 08/09/19 0458 08/08/19 0513 08/07/19 0540 08/06/19 0450   WBC 12.5* 15.8* 23.0* 18.5*   RBC 3.96 3.78* 3.73* 3.64*   HGB 11.3* 10.6* 10.6* 10.4*   HCT 35.9 34.3* 34.0* 33.4*   MCV 91 91 91 92   MCH 28.5 28.0 28.4 28.6   MCHC 31.5 30.9* 31.2* 31.1*   RDW 16.3* 16.5* 16.8* 16.9*    292 298 300     INR  Recent Labs   Lab 08/03/19  2123   INR 1.09     Arterial Blood Gas  Recent Labs   Lab 08/04/19  1117 08/04/19  0502 08/03/19  2200   O2PER 35% 35 percent  2L NC       All cultures:  Recent Labs   Lab 08/06/19  0845 08/03/19  2204   CULT Moderate growth  Candida albicans / dubliniensis  Candida albicans and Candida dubliniensis are not routinely speciated  Susceptibility testing not routinely done  *  Moderate growth  Coagulase negative Staphylococcus  Susceptibility testing not routinely done  * >100,000 colonies/mL  Enterococcus faecalis  *  >100,000 colonies/mL  Hafnia  rodolfo  *  Susceptibility testing requested by  Dr. Espitia for Meropenem on Félix reynolds 8.7.19 @0845 AV       Recent Results (from the past 24 hour(s))   CT Head w/o Contrast    Narrative    CT SCAN OF THE HEAD WITHOUT CONTRAST   8/3/2019 9:35 PM     HISTORY: Altered mental status. (LOC), unexplained    TECHNIQUE:  Axial images of the head and coronal reformations without  IV contrast material. Radiation dose for this scan was reduced using  automated exposure control, adjustment of the mA and/or kV according  to patient size, or iterative reconstruction technique.    COMPARISON: None.    FINDINGS:     Intracranial contents: There is diffuse parenchymal volume loss.   White matter changes are present in the cerebral hemispheres that are  consistent with small vessel ischemic disease in this age patient.  There is low-density in the right insular cortex region. This is best  seen on axial images 16 and 17. This is consistent with a cortical  infarct. The age of this is uncertain. No hemorrhage or mass effect.    Visualized orbits/sinuses/mastoids:  The visualized portions of the  sinuses and mastoids appear normal.    Osseous structures/soft tissues:  There is no evidence of trauma.      Impression    IMPRESSION:   1. No intracranial hemorrhage.  2. Low dense area in the right insular cortex region. This is  consistent with an infarct. Age of this is uncertain. It could be  chronic.  3. There is diffuse parenchymal volume loss.  White matter changes are  present in the cerebral hemispheres that are consistent with small  vessel ischemic disease in this age patient.      CURTIS AVILA MD   CTA Head Neck with Contrast    Narrative    CT ANGIOGRAM OF THE HEAD AND NECK  8/3/2019 9:45 PM     HISTORY: Altered mental status. Code stroke    TECHNIQUE:  Precontrast localizing scans were followed by CT  angiography with an injection of 70 mL IV contrast with scans through  the head and neck.  Images were transferred to a separate  3-D  workstation where multiplanar reformations and 3-D images were  created.  Estimates of carotid stenoses are made relative to the  distal internal carotid artery diameters except as noted. Radiation  dose for this scan was reduced using automated exposure control,  adjustment of the mA and/or kV according to patient size, or iterative  reconstruction technique.    COMPARISON: None.    FINDINGS: Estimates of stenosis at the carotid bifurcations are  relative to the distal internal carotids.    Arch: Calcified atherosclerotic plaque is seen in the arch of the  aorta. Extensive calcified atherosclerotic plaque is seen at the  origin of the right subclavian artery.    Neck:  Right Carotid:  The right common carotid artery is normal.  Calcified  atherosclerotic plaque is seen at the right carotid bifurcation. The  degree of stenosis is less than 50%..  The right internal carotid  artery is negative.     Left Carotid:  The left common carotid artery is unremarkable.   Calcified atherosclerotic plaque is seen at the left carotid  bifurcation. There is a severe stenosis at the bifurcation.  The left  internal carotid is negative.      Vertebrals:  The right vertebral artery is occluded proximally 2 cm  distal to the origin. There is collateral filling of the distal, V4  segment of the right vertebral artery. The left vertebral artery is a  large vessel and it supplies the basilar artery..    Head:  Right Carotid:No occluded vessels are seen. .    Left Carotid: There is a filling defect in the M1 segment of the left  middle cerebral artery consistent with thromboembolic disease. This is  causing a high-grade stenosis, near occlusion...  There is filling of  the inferior division of the left middle cerebral..    Basilar:  The basilar artery and its branches appear normal.     Miscellaneous: None      Impression    IMPRESSION:   1. Filling defect in the M1 segment of the left middle cerebral  leading to a high-grade  stenosis. There is filling of inferior  division of the left middle cerebral.   2. High-grade stenosis at the left carotid bifurcation.  3. Mild stenosis right carotid bifurcation.  4. 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    Report was called to Dr. Gonzalez at 9:50 PM   XR Chest Port 1 View    Narrative    XR CHEST PORTABLE 1 VIEW   8/3/2019 10:45 PM     HISTORY: Post intubation.    COMPARISON: None.    FINDINGS: Supine portable chest. ET tube in place with tip  approximately 3 cm above the thomas. NG tube tip is near the  gastroesophageal junction. No pneumothorax. The heart is enlarged.  There is no pulmonary edema. Thoracic aorta is calcified. Mild left  basilar atelectasis or scarring. The lungs are otherwise clear.      Impression    IMPRESSION: ET tube 3 cm above the thomas. NG tube tip near the  gastroesophageal junction.     Labs (personally reviewed/interpreted):  See a/p.    Billing: This patient is critically ill: Yes. Total critical care time today 35 min.

## 2019-08-09 NOTE — PROGRESS NOTES
Called By Dr. Sanchez to tx care of the pt :   Patient is 84 years old female who was admitted for left MCA infarction and was intubated for airway protection.    Per Dr. Ambriz patient was extubated to comfort care and plan  to transfer her to hospice later.    Per  walker patient was seen and examined by him today and we do not have to see her today.

## 2019-08-09 NOTE — PROGRESS NOTES
SPIRITUAL HEALTH SERVICES Progress Note  [facility] ICU    Initial visit with pt Nessa per staff referral.     Pt's  and pt's daughter were present in the beginning, but five other family members came in later on.   The family is grieving and having hard time.    Pt's dante shared that pt is his best friend, they have been  for over 60 years. Pt stated that pt is a good wife, mother, and grandmother.  Pt's  addresses that pt is [employer], but doesn't belong to [employer].     Pt's  briefly talked about one of his sons, he lost his son three years ago. Pt has three daughters and two sons.      provided presence in care, empathetic listening, reading scripture, and prayer.     SH remains available if additional needs arise.         Pratima Osborne  Chaplain Resident

## 2019-08-09 NOTE — PLAN OF CARE
Pt remains lethargic and on full vent support.  Withdraws on RUE and RLE, purposeful on L.  Pupils PERRL.  Doesn't follow commands.  Rectal tube in place, patent.  South with adequate UOP.  Skin care protocol in place.  Daughter Amber called at 0600, discussed intent to speak with intensivist about withdrawing care today.  Continue to monitor.

## 2019-08-10 NOTE — PROGRESS NOTES
"[facility] Internal Medicine Progress Note     Date of Service (when I saw the patient): 08/10/2019    REASON FOR ADMISSION / INTERVAL HISTORY:  Acute CVA 8/4-extubated 8/9. Comfort cares now.    ASSESSMENT/PLAN:   ACUTE B MCA CVA  Bilateral MCA territory infarctions, status post thrombectomy x2 and alteplase x1. Pt has been on vent until 8/9-without improvement. After family discussion it was decided to extubate her and provide comfort cares.  Transfer to med-surg. Comfort cares.    DISPO  May need NH. Will have SW see.      UGO GARSIA MD   Pg [phone]    DVT Prhylaxis: none    Code Status: DNR/DNI    ROS:  As described in A/P and Exam.  Otherwise ALL are  negative.    PHYSICAL EXAM:  All vitals have been reviewed    Blood pressure (!) 107/30, pulse 106, temperature 98.1  F (36.7  C), temperature source Axillary, resp. rate (!) 32, height 1.626 m (5' 4.02\"), weight 77.5 kg (170 lb 13.7 oz), SpO2 (!) 88 %.    I/O this shift:  In: -   Out: 175 [Urine:175]    GENERAL APPEARANCE:unresponsive  EYES: conjunctiva clear, eyes grossly normal  RESP: lungs clear to auscultation - no rales, rhonchi or wheezes  CV: regular rate and rhythm, normal S1 S2, no S3 or S4 and no murmur, click or rub   ABDOMEN: soft, nontender, no HSM or masses and bowel sounds normal  MS: no clubbing, cyanosis; no edema  NEURO: -unresponsive    ROUTINE  LABS (Last four results)  CMP  Recent Labs   Lab 08/09/19  0458 08/08/19  0513 08/07/19  0540 08/06/19  0450  08/03/19  2123    143 143 147*   < > 141   POTASSIUM 3.6 3.7 3.3* 3.4   < > 3.4   CHLORIDE 109 110* 110* 115*   < > 109   CO2 28 28 25 25   < > 25   ANIONGAP 7 5 8 7   < > 7   * 155* 108* 154*   < > 236*   BUN 21 19 13 19   < > 26   CR 0.84 0.81 0.86 0.77   < > 1.06*   GFRESTIMATED 64 66 62 71   < > 48*   GFRESTBLACK 74 77 72 82   < > 56*   KADEEM 8.9 8.6 8.4* 8.6   < > 9.2   MAG 2.0 2.0 2.0 1.9   < >  --    PHOS 2.8 2.8 3.3 2.4*   < >  --    PROTTOTAL  --   --   --   --   " --  6.8   ALBUMIN  --   --   --   --   --  3.1*   BILITOTAL  --   --   --   --   --  0.5   ALKPHOS  --   --   --   --   --  60   AST  --   --   --   --   --  30   ALT  --   --   --   --   --  18    < > = values in this interval not displayed.     CBC  Recent Labs   Lab 08/09/19  0458 08/08/19  0513 08/07/19  0540 08/06/19  0450   WBC 12.5* 15.8* 23.0* 18.5*   RBC 3.96 3.78* 3.73* 3.64*   HGB 11.3* 10.6* 10.6* 10.4*   HCT 35.9 34.3* 34.0* 33.4*   MCV 91 91 91 92   MCH 28.5 28.0 28.4 28.6   MCHC 31.5 30.9* 31.2* 31.1*   RDW 16.3* 16.5* 16.8* 16.9*    292 298 300     INR  Recent Labs   Lab 08/03/19  2123   INR 1.09     Arterial Blood Gas  Recent Labs   Lab 08/04/19  1117 08/04/19  0502 08/03/19  2200   O2PER 35% 35 percent  2L NC       No results found for this or any previous visit (from the past 24 hour(s)).

## 2019-08-10 NOTE — PROGRESS NOTES
Transferred to [facility] at 1213 via cart. Open eyes spontaneously. Not following. On RA. flexseal and lindo in place for comfort care. A. Fib w/ RVR. Bruises throughout the body. PIV x2 SL. Family notified about room change. No patient belongings at bedside.

## 2019-08-10 NOTE — CONSULTS
Care Transition Initial Assessment - SW     Met with: Patient    Active Problems:    Acute CVA (cerebrovascular accident) (H)       DATA  Lives With: (P) child(jasmina), adult, spouse   Who is your support system?: (P) , Children  Identified issues/concerns regarding health management:      Per social service protocol for discharge planning. Patient admitted inpatient  On 08/04/2019. Discharge date is TBD. SW asked by bedside RN to meet with family as they had been requesting to speak with SW. In referencing the ICU SW notes on 08/08/19, asked Care Coordinator to come into the meeting with family.  Family had asked for information regarding moving patient to another facility. They had asked if she could be moved to [facility] where she would be closer to patient's spouse( this would be 1 [place]/2 hour drive north ). It was explained that it would not be appropriate since patient is comfort care.  Explained different options for hospice (SNF, residential hospice facility, and hospice at home). Family explained that financially they are not able to pay privately.  Discussed with them that they would be able to talk with hospice regarding these options. Family chose [facility] and they have agreed to meet with them at 1530 on 8/11/19.     ASSESSMENT  Cognitive Status:  non-responsive  Concerns to be addressed: Discharge planning .     PLAN  Will continue to follow.

## 2019-08-10 NOTE — PROGRESS NOTES
"Stroke Service Progress Note  Resting comfortably.  Extubated.    08/10/2019     Problem List:  Patient Active Problem List   Diagnosis     Acute CVA (cerebrovascular accident) (H)     Benign essential hypertension     Carotid stenosis     Chronic kidney disease (CKD) stage G3a/A2, moderately decreased glomerular filtration rate (GFR) between 45-59 mL/min/1.73 square meter and albuminuria creatinine ratio between  mg/g (H)     Diabetes mellitus, type II (H)     Acute ischemic right middle cerebral artery (MCA) stroke (H)     Giant cell arteritis (H)     GERD (gastroesophageal reflux disease)     Hypothyroidism     Mixed hyperlipidemia     Paroxysmal atrial fibrillation (H)     Back pain without radiation       Current Medications:    midazolam  1 mg Intravenous Once     pantoprazole  40 mg Oral QAM AC     sodium chloride (PF)  3 mL Intravenous Q8H     Physical Examination:   BP (!) 107/30   Pulse 106   Temp 98.1  F (36.7  C) (Axillary)   Resp 17   Ht 1.626 m (5' 4.02\")   Wt 77.5 kg (170 lb 13.7 oz)   SpO2 90%   BMI 29.31 kg/m      Exam: Not examined, comfort measures only.  No eye-opening observed.    Labs/Studies: Imaging results copied directly to ensure accuracy  Lab Results   Component Value Date    A1C 7.1 08/04/2019     LDL checked during recent admission 58 on 7/26    MRI:   \"IMPRESSION:  1.  Multiple areas of restricted diffusion in the right and left  frontal lobes, the left basal ganglia, and in the cortex of the left  parietal lobe. These are consistent with evolving infarcts.   2. There are susceptibility hypointensities in both the right and left  frontal lobes and left basal ganglia. This is likely due to petechial  hemorrhage. This corresponds to the areas of high density seen on the  previous head CT.  3. Mild mass effect on the left lateral ventricle, but no significant  shift of midline structures.\"    CTH follow-up:  \"IMPRESSION: Evolving bilateral middle cerebral artery " "ischemic  infarcts. No evidence for any hemorrhagic transformation or  significant mass effect.\"    Echo:  \"Interpretation Summary     A contrast injection (Bubble Study) was performed that was negative for flow  across the interatrial septum.  The visual ejection fraction is estimated at 50-55%.  Left ventricular systolic function is low normal.  There is mild to moderate (1-2+) mitral regurgitation.  There is mild to moderate (1-2+) tricuspid regurgitation.  Right ventricular systolic pressure is elevated, consistent with mild to  moderate pulmonary hypertension.  The rhythm was rapid atrial fibrillation.  The study was technically difficult. The study was technically limited.\"    ASSESSMENT/PLAN:   Ms Chan is an 84-year-old woman with history of atrial fibrillation (previously on apixaban) currently admitted to the ICU for treatment of now bilateral MCA territory strokes. After discussion with family 8/9, the focus of her cares was transitioned to comfort measures only. Palliative extubation 8/9.    #Bilateral MCA territory infarctions, status post thrombectomy x2 and alteplase x1  - Extubated to comfort measures only 8/9    =========================================================    This patient was seen & discussed with attending neurologist, Dr. Rivero.     Mario Castano DO  PGY5 Neurocritical Care Fellow    08/10/2019 8:44 AM  Text Page (8am-7pm)  To page stroke neurology after hours or on a subsequent day, click here: [facility]  Choose \"On Call\" tab at top, then search dropdown box for \"Neurology Adult\" & press Enter, look for Neuro ICU/Stroke          "

## 2019-08-10 NOTE — PLAN OF CARE
Transfer from ICU, comfort care. Periods of apnea 10 - 30 seconds. Extremities warm. Dilaudid x 1 when moaning, quieted. Continue to provide comfort cares, discharge for hospice care pending if stable for discharge possibly Monday

## 2019-08-11 NOTE — PROGRESS NOTES
"[facility] Internal Medicine Progress Note     Date of Service (when I saw the patient): 08/11/2019    REASON FOR ADMISSION / INTERVAL HISTORY:  Acute CVA 8/4-extubated 8/9. Comfort cares now.    ASSESSMENT/PLAN:   ACUTE B MCA CVA  Bilateral MCA territory infarctions, status post thrombectomy x2 and alteplase x1. Pt has been on vent until 8/9-without improvement. After family discussion it was decided to extubate her and provide comfort cares.  Continue Comfort cares.    DISPO  SW on case-?home with hospice. Apparently family cant afford NH      UGO GARSIA MD   Pg [phone]    DVT Prhylaxis: none    Code Status: DNR/DNI    ROS:  As described in A/P and Exam.  Otherwise ALL are  negative.    PHYSICAL EXAM:  All vitals have been reviewed    Blood pressure (!) 107/30, pulse 106, temperature 98.1  F (36.7  C), temperature source Axillary, resp. rate (!) 32, height 1.626 m (5' 4.02\"), weight 77.5 kg (170 lb 13.7 oz), SpO2 (!) 88 %.    No intake/output data recorded.    GENERAL APPEARANCE:unresponsive  Pt not examined.    ROUTINE  LABS (Last four results)  CMP  Recent Labs   Lab 08/09/19 0458 08/08/19 0513 08/07/19  0540 08/06/19  0450    143 143 147*   POTASSIUM 3.6 3.7 3.3* 3.4   CHLORIDE 109 110* 110* 115*   CO2 28 28 25 25   ANIONGAP 7 5 8 7   * 155* 108* 154*   BUN 21 19 13 19   CR 0.84 0.81 0.86 0.77   GFRESTIMATED 64 66 62 71   GFRESTBLACK 74 77 72 82   KADEEM 8.9 8.6 8.4* 8.6   MAG 2.0 2.0 2.0 1.9   PHOS 2.8 2.8 3.3 2.4*     CBC  Recent Labs   Lab 08/09/19 0458 08/08/19 0513 08/07/19  0540 08/06/19  0450   WBC 12.5* 15.8* 23.0* 18.5*   RBC 3.96 3.78* 3.73* 3.64*   HGB 11.3* 10.6* 10.6* 10.4*   HCT 35.9 34.3* 34.0* 33.4*   MCV 91 91 91 92   MCH 28.5 28.0 28.4 28.6   MCHC 31.5 30.9* 31.2* 31.1*   RDW 16.3* 16.5* 16.8* 16.9*    292 298 300     INR  No lab results found in last 7 days.  Arterial Blood Gas  No lab results found in last 7 days.    No results found for this or any previous " visit (from the past 24 hour(s)).

## 2019-08-11 NOTE — CONSULTS
"Hospice Consult 8/11/19 3:40pm    Writer met with spouse Rad, dtr Sharon, dtr Bree, granddaughters Shanice and Karly. Explained hospice philosophy, benefit and services. Rad and Sharon were not very receptive of hospice information. Rach stated \"an ED doc said she could stay here until she passes and now you are kicking her out.\" Writer explained the hospital MD makes discharge plans and writer was here to give information on hospice and how hospice can support pt and family in her home. Rad then stated \"we don't want her to die in transport it's over an hour long ride and there's construction.\"     Writer came back into room with RN Care coordinator Jonelle to further discuss discharge process and hospice services. Sharon became upset with information  being given and walked out of the room. Pt spouse Rad stated \"she's not leaving her unless I say so.\" Granddaughters Karly and Shanice and daughter Bree receptive of discharge planning and hospice.  Emotional support provided to family.   Pt not admitted to hospice today and no consents signed as patient's spouse Rad was upset and wants to talk with MD first.  Writer left hospice bridge sheet with daughter Bree.  Care coordinated with Jonelle, RN Care coordinator and GINA Bryant.  No discharge plan at this time.  I do not think she should be transferred to home as it is 1.5 hours away and family has greatly emphasized they do not want her to die during transport.    Thank you for this referral,  ANDREEA Cast[facility]eda [facility] RN  Office: [phone]     "

## 2019-08-11 NOTE — PLAN OF CARE
Comfort cares. Unable to assess orientation, obtunded and unresponsive. VS and full assessment deferred. PRN dilaudid given to manage nonverbal indicators of pain. Frequent periods of apnea. Rectal tube and lindo patent, draining adequately. Sacral mepilex CDI. PIV x2 SL. NPO, frequent oral cares. Turn/repo as tolerated. Family at bedside overnight. Plan possible to discharge on hospice pending family POA decision

## 2019-08-12 NOTE — PROGRESS NOTES
"[facility] Internal Medicine Progress Note     Date of Service (when I saw the patient): 08/12/2019    REASON FOR ADMISSION / INTERVAL HISTORY:  Acute CVA 8/4-extubated 8/9. Comfort cares now.    ASSESSMENT/PLAN:   ACUTE B MCA CVA  Bilateral MCA territory infarctions, status post thrombectomy x2 and alteplase x1. Pt has been on vent until 8/9-without improvement. After family discussion it was decided to extubate her and provide comfort cares.  Continue Comfort cares.    DISPO  SW on case-?home with hospice.  Patient is not comfortable taking her for 1-1/2 Hour  back home.   on case, we will see how she does in the next 24 hours.  If she remains stable post likely will need a placement with hospice.      Efe Alves MD    DVT Prhylaxis: none    Code Status: DNR/DNI    Interval history  Patient remained unconscious at this time.  But comfortable.    PHYSICAL EXAM:  All vitals have been reviewed    Blood pressure (!) 107/30, pulse 106, temperature 98.1  F (36.7  C), temperature source Axillary, resp. rate (!) 32, height 1.626 m (5' 4.02\"), weight 77.5 kg (170 lb 13.7 oz), SpO2 (!) 88 %.    No intake/output data recorded.    GENERAL APPEARANCE:unresponsive  EXAM:  Constitutional: Unconscious  Cardiovascular: S1-S2 normal  Respiratory: Erratic breathing,  Abdomen: Abdomen soft, non-tender. BS normal. No masses, organomegaly      ROUTINE  LABS (Last four results)  CMP  Recent Labs   Lab 08/09/19  0458 08/08/19  0513 08/07/19  0540 08/06/19  0450    143 143 147*   POTASSIUM 3.6 3.7 3.3* 3.4   CHLORIDE 109 110* 110* 115*   CO2 28 28 25 25   ANIONGAP 7 5 8 7   * 155* 108* 154*   BUN 21 19 13 19   CR 0.84 0.81 0.86 0.77   GFRESTIMATED 64 66 62 71   GFRESTBLACK 74 77 72 82   KADEEM 8.9 8.6 8.4* 8.6   MAG 2.0 2.0 2.0 1.9   PHOS 2.8 2.8 3.3 2.4*     CBC  Recent Labs   Lab 08/09/19  0458 08/08/19  0513 08/07/19  0540 08/06/19  0450   WBC 12.5* 15.8* 23.0* 18.5*   RBC 3.96 3.78* 3.73* 3.64*   HGB " 11.3* 10.6* 10.6* 10.4*   HCT 35.9 34.3* 34.0* 33.4*   MCV 91 91 91 92   MCH 28.5 28.0 28.4 28.6   MCHC 31.5 30.9* 31.2* 31.1*   RDW 16.3* 16.5* 16.8* 16.9*    292 298 300     INR  No lab results found in last 7 days.  Arterial Blood Gas  No lab results found in last 7 days.    No results found for this or any previous visit (from the past 24 hour(s)).

## 2019-08-12 NOTE — PLAN OF CARE
Continues to have irregular respiration, tachypnea and apneic periods. Ativan and Morphine give for dyspnea., which help. Does not appear to have pain. Extremities warm. No purposeful movement, unresponsive except briefly opens her eyes at times. Discharge with Hospice pending, family not accepting discharge plans.

## 2019-08-12 NOTE — ADDENDUM NOTE
Addendum  created 08/12/19 1106 by Filippo Alejandre MD    Intraprocedure Event deleted, Intraprocedure Event edited

## 2019-08-12 NOTE — PROGRESS NOTES
Chart reviewed for f/u  8/9: pt extubated, FT pulled, made comfort cares  Diet: NPO  No nutrition intervention at this time  Will be available if needed    Suma Lee RD, LD  Clinical Dietitian - [facility]  Pager - [phone]

## 2019-08-12 NOTE — PLAN OF CARE
Full assessment and vitals deferred d/t comfort cares. Lethargic, opens eyes with movement at times. Rotating sublingual morphine and IV ativan. Periods of apnea. Atropine drops given x1 for secretions. Repositioned q2h. South and rectal tube. NPO, oral cares done. PIV SL. Possible discharge to hospice pending.

## 2019-08-12 NOTE — PLAN OF CARE
Respirations irregular, apneic periods and periods of rapid breathing, ativan and dilaudid (which was changed to SL morphine concentrate) given which helps. Does not appear to have pain. Extremities warm. Opens eyes at times with cares, no other response noted. Possible discharge with hospice pending.

## 2019-08-13 NOTE — PLAN OF CARE
Pt. Kept comfortable with PRN morphine, ativan and atropine. T&R for comfort. Frequent oral cares provided. At 11:35 house officer pronounced pt dead. Family notified. Belongings returned to family. South and PIV removed. Pt. Discharged to [facility].

## 2019-08-13 NOTE — PROGRESS NOTES
Palliative Care Social Work Note    Brief visit with Nessa this morning after consult with Unit SW and bedside RN.  At this time Nessa appears to be actively dying, no plans for dispo today. No family present, but they are apparently planning to come later this afternoon.      Palliative Care Will continue to be available as needed.     SAVANA Shaw, [facility]   Palliative Care    Pgr:[phone]  Ph: [phone]

## 2019-08-13 NOTE — PLAN OF CARE
Comfort cares continued; unable to assess orientation. Tachypnea; administered sublingual morphine x2 and IV Ativan x1. Turn/repo Q2hr. Possible discharge 8/13 pending placement and pt status.

## 2019-08-13 NOTE — PROGRESS NOTES
"[facility] Internal Medicine Progress Note     Date of Service (when I saw the patient): 08/13/2019    REASON FOR ADMISSION / INTERVAL HISTORY:  Acute CVA 8/4-extubated 8/9. Comfort cares now.    ASSESSMENT/PLAN:   ACUTE B MCA CVA  Bilateral MCA territory infarctions, status post thrombectomy x2 and alteplase x1. Pt has been on vent until 8/9-without improvement. After family discussion it was decided to extubate her and provide comfort cares.  Continue Comfort cares.  Erratic breathing, apneas.  Prognosis poor.  Continue end-of-life care at this time.    DISPO  Patient prognosis poor, and breathing changes today.  We will see how she does    Efe Alves MD    DVT Prhylaxis: none    Code Status: DNR/DNI    Interval history  Patient remained unconscious, erratic breathing, episodes of apnea.  On occasion patient does cough.    PHYSICAL EXAM:  All vitals have been reviewed    Blood pressure (!) 107/30, pulse 106, temperature 98.1  F (36.7  C), temperature source Axillary, resp. rate (!) 42, height 1.626 m (5' 4.02\"), weight 77.5 kg (170 lb 13.7 oz), SpO2 (!) 88 %.    No intake/output data recorded.    GENERAL APPEARANCE:unresponsive  EXAM:  Constitutional: Unconscious  Cardiovascular: S1-S2 normal  Respiratory: Erratic breathing,  Abdomen: Abdomen soft, non-tender. BS normal. No masses, organomegaly      ROUTINE  LABS (Last four results)  CMP  Recent Labs   Lab 08/09/19  0458 08/08/19  0513 08/07/19  0540    143 143   POTASSIUM 3.6 3.7 3.3*   CHLORIDE 109 110* 110*   CO2 28 28 25   ANIONGAP 7 5 8   * 155* 108*   BUN 21 19 13   CR 0.84 0.81 0.86   GFRESTIMATED 64 66 62   GFRESTBLACK 74 77 72   KADEEM 8.9 8.6 8.4*   MAG 2.0 2.0 2.0   PHOS 2.8 2.8 3.3     CBC  Recent Labs   Lab 08/09/19  0458 08/08/19  0513 08/07/19  0540   WBC 12.5* 15.8* 23.0*   RBC 3.96 3.78* 3.73*   HGB 11.3* 10.6* 10.6*   HCT 35.9 34.3* 34.0*   MCV 91 91 91   MCH 28.5 28.0 28.4   MCHC 31.5 30.9* 31.2*   RDW 16.3* 16.5* 16.8*   PLT " 306 292 298     INR  No lab results found in last 7 days.  Arterial Blood Gas  No lab results found in last 7 days.    No results found for this or any previous visit (from the past 24 hour(s)).

## 2019-08-13 NOTE — PROGRESS NOTES
House Officer Death Pronouncement    Called by nursing staff to pronounce Nessa Chan dead.    Physical Exam: Unresponsive to noxious stimuli, Spontaneous respirations absent, Breath sounds absent, Carotid pulse absent, Heart sounds absent and Pupillary light reflex absent    Patient was pronounced dead at 1135: AM, 2019.    No data filed        Active Problems:    Acute CVA (cerebrovascular accident) (H)       Infectious disease present?: NO    Communicable disease present? (examples: HIV, chicken pox, TB, Ebola, CJD) :  NO    Multi-drug resistant organism present? (example: MRSA): NO    Please consider an autopsy if any of the following exist:  NO Unexpected or unexplained death during or following any dental, medical, or surgical diagnostic treatment procedures.   NO Death of mother at or up to seven days after delivery.     NO All  and pediatric deaths.     NO Death where the cause is sufficiently obscure to delay completion of the death certificate.   NO Deaths in which autopsy would confirm a suspected illness/condition that would affect surviving family members or recipients of transplanted organs.     The following deaths must be reported to the 's Office:  NO A death that may be due entirely or in part to any factors other than natural disease (recent surgery, recent trauma, suspected abuse/neglect).   NO A death that may be an accident, suicide, or homicide.     NO Any sudden, unexpected death in which there is no prior history of significant heart disease or any other condition associated with sudden death.   NO A death under suspicious, unusual, or unexpected circumstances.    NO Any death which is apparently due to natural causes but in which the  does not have a personal physician familiar with the patient s medical history, social, or environmental situation or the circumstances of the terminal event.   NO Any death apparently due to Sudden Infant Death  Syndrome.     NO Deaths that occur during, in association with, or as consequences of a diagnostic, therapeutic, or anesthetic procedure.   NO Any death in which a fracture of a major bone has occurred within the past (6) six months.   NO A death of persons note seen by their physician within 120 days of demise.     NO Any death in which the  was an inmate of a public institution or was in the custody of Law Enforcement personnel.   NO  All unexpected deaths of children   NO Solid organ donors   NO Unidentified bodies   NO Deaths of persons whose bodies are to be cremated or otherwise disposed of so that the bodies will later be unavailable for examination;   NO Deaths unattended by a physician outside of a licensed healthcare facility or licensed residential hospice program   NO Deaths occurring within 24 hours of arrival to a health care facility if death is unexpected.    NO Deaths associated with the decedent s employment.   NO Deaths attributed to acts of terrorism.   NO Any death in which there is uncertainty as to whether it is a medical examiner s care should be discussed with the medical investigator.      Death Certificate to be directed to Dr. Efe Alves  Attending physician, Dr. Dr. Efe Alves, notified of death.    Body disposition: Autopsy was discussed with family member:  grandSon in person.  Permission for autopsy was declined.      Alpa Darling, [facility]  [phone]     Text Page

## 2019-08-13 NOTE — PLAN OF CARE
Comfort cares continue. Unresponsive; unable to assess orientation. Full assessment and VS deferred. RR in the 30's on shift with periods of apnea too. IV ativan and oral MS concenetrate given for dyspnea. South and rectal tube in place with minimal output. 2 PIV's SL. NPO, oral cares done on shift. Bedrest. Turn/repo Q2hr. Possible discharge 8/13 pending placement and pt status.

## 2019-08-14 LAB
BACTERIA SPEC CULT: ABNORMAL
SPECIMEN SOURCE: ABNORMAL

## 2019-08-14 NOTE — DISCHARGE SUMMARY
[facility]    Discharge Summary  Hospitalist    Date of Admission:  2019  Date of Discharge:  2019  6:04 PM  Discharging Provider: Efe Alves MD  Date of Service (when I saw the patient): 2019    Discharge Diagnoses   Bilateral MCA territory infarctions, status post thrombectomy x2 and alteplase x1  No airway protection secondary to stroke needing intubation mechanical ventilation  Cardiopulmonary arrest  Comfort care    History of Present Illness   Nessa Chan is an 84 year old female who presented with  aphasia and right-sided weakness    Hospital Course      Time of death 11:35 AM  Date of death 2019 primary cause of death cardiopulmonary arrest  Secondary to bilateral CVA    This is a 84-year-old female with history of atrial fibrillation not on any anticoagulation currently, hypertension, hypothyroidism, chronic kidney disease stage III, osteoporosis, recently [facility] was at the [facility] when she found to be altered mental status, aphasia and right-sided weakness subsequently was taken to the ER.  She was given TPA and subsequently transferred to [facility].  Patient has a code stroke with extensive work-up done.  She has a left MCA stroke and does require thrombectomy.  She was intubated and on mechanical ventilation because of airway protection.  She was admitted to the ICU by the critical care team.  And neuro critical care following her.    Unfortunately she did not responded to the treatment and remained unresponsive, she did not woke up with her situation either.  Given her poor prognosis, palliative care was consulted, and after long discussion with the extended family it was decided to keep her comfort care and that support was withdrawn.  She was extubated on 2019 and put on end-of-life care for comfort care only.    Patient  comfortably at 11:35 AM on 2019.     Efe Alves MD     DVT  Prhylaxis: none     Code Status: DNR/DNI  Efe Alves MD    Significant Results and Procedures   TITLE OF PROCEDURES:   1. Catheterization of right common femoral artery.  2. Catheterization of left common carotid artery.   3. Catheterization of left internal carotid artery.   4. Microcatheterization of left middle cerebral artery.   5. Thrombectomy of left middle cerebral artery thrombus with direct  aspiration.  6. Percutaneous closure of right common femoral arteriotomy with  Angio-Seal device.   7. Interpretation of films.             Primary Care Physician   Gerda Lopez        Consultations This Hospital Stay   NEUROLOGY IP CONSULT  NUTRITION SERVICES ADULT IP CONSULT  PHARMACY IP CONSULT  PHARMACY IP CONSULT  PALLIATIVE CARE ADULT IP CONSULT  PHARMACY TO DOSE VANCO  CARE TRANSITION RN/SW IP CONSULT    Time Spent on this Encounter   I, Efe Alves, discharged this patient today but I did not personally see the patient today and will not be billing for the patient's discharge.    Discharge Orders   No discharge procedures on file.  Discharge Medications   Discharge Medication List as of 8/13/2019  6:05 PM      CONTINUE these medications which have NOT CHANGED    Details   acetaminophen (TYLENOL) 650 MG CR tablet Take 650 mg by mouth every 6 hours as needed for mild pain or fever, Historical      amLODIPine (NORVASC) 10 MG tablet Take 10 mg by mouth daily, Historical      aspirin 81 MG EC tablet Take 81 mg by mouth daily, Historical      bisacodyl (DULCOLAX) 10 MG suppository Place 10 mg rectally daily as needed for constipation, Historical      carboxymethylcellulose PF (REFRESH PLUS) 0.5 % ophthalmic solution Place 1 drop into both eyes daily as needed for dry eyes, Historical      cyanocobalamin (VITAMIN B-12) 1000 MCG tablet Take 1,000 mcg by mouth daily, Historical      ezetimibe (ZETIA) 10 MG tablet Take 10 mg by mouth daily, Historical      FENOFIBRATE PO Take 100 mg by mouth daily , Historical       folic acid (FOLVITE) 1 MG tablet Take 1 mg by mouth daily, Historical      levothyroxine (SYNTHROID/LEVOTHROID) 75 MCG tablet Take 75 mcg by mouth daily, Historical      magnesium hydroxide (MILK OF MAGNESIA) 400 MG/5ML suspension Take 30 mLs by mouth daily as needed for constipation or heartburn, Historical      melatonin 3 MG tablet Take 6 mg by mouth nightly as needed for sleep, Historical      Metoprolol Tartrate 75 MG TABS Take 75 mg by mouth 2 times daily, Historical      omeprazole (PRILOSEC) 20 MG DR capsule Take 20 mg by mouth daily, Historical      sertraline (ZOLOFT) 100 MG tablet Take 100 mg by mouth daily, Historical      timolol maleate (TIMOPTIC) 0.5 % ophthalmic solution Place 1 drop into both eyes 2 times daily, Historical           Allergies   Allergies   Allergen Reactions     Ciprofloxacin      Other reaction(s): Dizziness     Hmg-Coa-R Inhibitors      Myalgias. Zocor, Crestor, Mevacor, Lipitor.      Data   Most Recent 3 CBC's:  Recent Labs   Lab Test 08/09/19  0458 08/08/19  0513 08/07/19  0540   WBC 12.5* 15.8* 23.0*   HGB 11.3* 10.6* 10.6*   MCV 91 91 91    292 298      Most Recent 3 BMP's:  Recent Labs   Lab Test 08/09/19  0458 08/08/19  0513 08/07/19  0540    143 143   POTASSIUM 3.6 3.7 3.3*   CHLORIDE 109 110* 110*   CO2 28 28 25   BUN 21 19 13   CR 0.84 0.81 0.86   ANIONGAP 7 5 8   KADEEM 8.9 8.6 8.4*   * 155* 108*     Most Recent 2 LFT's:  Recent Labs   Lab Test 08/03/19 2123   AST 30   ALT 18   ALKPHOS 60   BILITOTAL 0.5     Most Recent INR's and Anticoagulation Dosing History:  Anticoagulation Dose History     Recent Dosing and Labs Latest Ref Rng & Units 8/3/2019    INR 0.86 - 1.14 1.09        Most Recent 3 Troponin's:  Recent Labs   Lab Test 08/04/19  0502 08/03/19  2123   TROPI 0.020 <0.015     Most Recent Cholesterol Panel:No lab results found.  Most Recent 6 Bacteria Isolates From Any Culture (See EPIC Reports for Culture Details):  Recent Labs   Lab Test  08/06/19  0845 08/03/19  2204   CULT Moderate growth  Candida albicans / dubliniensis  Candida albicans and Candida dubliniensis are not routinely speciated  Susceptibility testing not routinely done  *  Moderate growth  Coagulase negative Staphylococcus  Susceptibility testing not routinely done  *  Light growth  Aspergillus fumigatus  * >100,000 colonies/mL  Enterococcus faecalis  *  >100,000 colonies/mL  Hafnia alvei  *  Susceptibility testing requested by  Dr. Espitia for Meropenem on Hafjennie alvei 8.7.19 @0845 AV       Most Recent TSH, T4 and A1c Labs:  Recent Labs   Lab Test 08/04/19  0502   A1C 7.1*     Results for orders placed or performed during the hospital encounter of 08/04/19   CT Head w/o Contrast    Narrative    EXAM: CT HEAD W/O CONTRAST, CTA HEAD WITH CONTRAST  LOCATION: [facility]  DATE/TIME: 8/4/2019 12:47 AM    INDICATION: A subacute right middle cerebral artery territory infarct and a new acute left middle cerebral artery infarct.  COMPARISON: 8/3/2019 and 7/26/2019.  CONTRAST: 50mL Isovue-370  TECHNIQUE: Head CT angiogram with IV contrast. Noncontrast head CT followed by axial helical CT images of the intracranial vessels obtained during the arterial phase of intravenous contrast administration. Axial helical CT perfusion study was performed   by the technologist. Dose reduction techniques were used.     FINDINGS:   NONCONTRAST HEAD CT:   INTRACRANIAL CONTENTS: There is no acute extra-axial fluid collection or intracranial hemorrhage. There is some vague increased density seen involving the right middle cerebral artery territory as compared to the previous noncontrast head CT which most   likely represents contrast staining in this region. This corresponds to the area of the previous subacute infarct. There is no evidence of a mass lesion or midline shift. There is diffuse scattered low-attenuation within the periventricular and   subcortical white matter consistent with diffuse  small vessel ischemic disease. There may be an increase in the low attenuation involving the mid to left frontal to left insular cortex region suggestive of early cytotoxic edema. This does correspond to   the region of the possible infarct. The ventricular system, basal cisterns and the cortical sulci are consistent with mild volume loss for age.     VISUALIZED ORBITS/SINUSES/MASTOIDS: No significant orbital abnormality. No significant paranasal sinus mucosal disease. No significant middle ear or mastoid effusion. Patient has a right-sided nasogastric tube in place.    BONES/SOFT TISSUES: No significant abnormality.    HEAD PERFUSION CTA:  On the CT perfusion images there is a relative increase in the cerebral blood volume and blood flow to the right middle cerebral artery territory consistent with a subacute infarct with reperfusion. On mean time to transit there is a diffuse delay   visualized within the left middle cerebral artery territory. There is a decrease in the relative cerebral blood flow and blood volume to the left middle cerebral artery territory. The calculated cerebral blood volume less than 30% volume: 39 ml;  Tmax   greater than 6 sec volume: 103 ml and the mismatch volume is 64 mm with a mismatch ratio of 2.6.      Impression    CONCLUSION:   HEAD CT:  1.  No evidence of a midline shift or hemorrhage. There is early possible early cytotoxic edema involving left anterior frontal lobe extending to left insular cortex.    2.  Vague increased density involving right middle cerebral artery territory consistent with contrast extravasation from previous CTA in region of the subacute infarct.    HEAD CTP:   1. CBF<30% volume: 39 ml  2.  Tmax >6.0 sec volume: 103 ml.  3.  Mismatch voluem; 64 ml and Mismatch ration: 2.6    These findings were communicated by phone to Dr. Rivero at 1:45 AM on 08/04/2019.       CTA Head with Contrast    Narrative    EXAM: CT HEAD W/O CONTRAST, CTA HEAD WITH CONTRAST  LOCATION:  [facility]  DATE/TIME: 8/4/2019 12:47 AM    INDICATION: A subacute right middle cerebral artery territory infarct and a new acute left middle cerebral artery infarct.  COMPARISON: 8/3/2019 and 7/26/2019.  CONTRAST: 50mL Isovue-370  TECHNIQUE: Head CT angiogram with IV contrast. Noncontrast head CT followed by axial helical CT images of the intracranial vessels obtained during the arterial phase of intravenous contrast administration. Axial helical CT perfusion study was performed   by the technologist. Dose reduction techniques were used.     FINDINGS:   NONCONTRAST HEAD CT:   INTRACRANIAL CONTENTS: There is no acute extra-axial fluid collection or intracranial hemorrhage. There is some vague increased density seen involving the right middle cerebral artery territory as compared to the previous noncontrast head CT which most   likely represents contrast staining in this region. This corresponds to the area of the previous subacute infarct. There is no evidence of a mass lesion or midline shift. There is diffuse scattered low-attenuation within the periventricular and   subcortical white matter consistent with diffuse small vessel ischemic disease. There may be an increase in the low attenuation involving the mid to left frontal to left insular cortex region suggestive of early cytotoxic edema. This does correspond to   the region of the possible infarct. The ventricular system, basal cisterns and the cortical sulci are consistent with mild volume loss for age.     VISUALIZED ORBITS/SINUSES/MASTOIDS: No significant orbital abnormality. No significant paranasal sinus mucosal disease. No significant middle ear or mastoid effusion. Patient has a right-sided nasogastric tube in place.    BONES/SOFT TISSUES: No significant abnormality.    HEAD PERFUSION CTA:  On the CT perfusion images there is a relative increase in the cerebral blood volume and blood flow to the right middle cerebral artery territory  consistent with a subacute infarct with reperfusion. On mean time to transit there is a diffuse delay   visualized within the left middle cerebral artery territory. There is a decrease in the relative cerebral blood flow and blood volume to the left middle cerebral artery territory. The calculated cerebral blood volume less than 30% volume: 39 ml;  Tmax   greater than 6 sec volume: 103 ml and the mismatch volume is 64 mm with a mismatch ratio of 2.6.      Impression    CONCLUSION:   HEAD CT:  1.  No evidence of a midline shift or hemorrhage. There is early possible early cytotoxic edema involving left anterior frontal lobe extending to left insular cortex.    2.  Vague increased density involving right middle cerebral artery territory consistent with contrast extravasation from previous CTA in region of the subacute infarct.    HEAD CTP:   1. CBF<30% volume: 39 ml  2.  Tmax >6.0 sec volume: 103 ml.  3.  Mismatch voluem; 64 ml and Mismatch ration: 2.6    These findings were communicated by phone to Dr. Rivero at 1:45 AM on 2019.       IR Carotid Cerebral Angiogram Left    Narrative    Date of Study: 2019  MRN: [id number]  Name: Nessa Chan  : 2019                                                               PREOPERATIVE DIAGNOSIS:   Left MCA ischemic stroke.    POSTOPERATIVE DIAGNOSIS:   Left MCA ischemic stroke.    TITLE OF PROCEDURES:   1. Catheterization of right common femoral artery.  2. Catheterization of left common carotid artery.   3. Catheterization of left internal carotid artery.   4. Microcatheterization of left middle cerebral artery.   5. Thrombectomy of left middle cerebral artery thrombus with direct  aspiration.  6. Percutaneous closure of right common femoral arteriotomy with  Angio-Seal device.   7. Interpretation of films.     ATTENDING: Mat Rivero MD    ANESTHESIA: General anesthesia was provided. Please see anesthesia  note for further details. Medications were administered by  the  anesthesia staff. Her vital signs were monitored continuously by the  anesthesia staff throughout the procedure.    FLUORO TIME: 17.4 minutes (Total 1185.44 mGy)  CONTRAST: 50 ml Isovue     Groin: 0107  Clot ID: [id number]  Recanalization: 0139    HISTORY OF PRESENT ILLNESS: Ms. Chan is a 84 year old woman who  presents with aphasia and right sided weakness. She was found to have  left M1 occlusion on the CT angiogram. She presents for mechanical  thrombectomy.    DESCRIPTION OF PROCEDURE: The patient was placed in supine position on  the angiography table. A timeout was performed. We prepared and draped  in the usual sterile fashion. After receiving local anesthetic, the  right common femoral artery was punctured with a 21 gauge needle and a  microwire was advanced into the right common femoral artery. The  needle was removed and a microsheath was inserted. It was exchanged  for a 5 Stateless sheath. A Vital-2 catheter over a glidewire was  advanced through the sheath and the left common carotid artery was  selected. A cervical run was performed. Under roadmap guidance, a  stiff angle glide exchange length wire was positioned in the ECA. The  catheter was removed along with the groin sheath. A 8 Stateless short  sheath was placed. A 90cm Neuron Max was advanced over the wire to the  distal cervical ICA. The dilator was removed. An ACE 68 over a 3 Max  microcatheter over a Fathom-16 microwire was prepared. It was  navigated into the left MCA . ACE 68 tip was abutted to the face of  the thrombus. 3 Max microcatheter and the microwire were removed.  After 3 minutes, it was retrieved under aspiration. Cranial run was  performed. There was persistent occlusion. We repeated the above steps  for another direct aspiration. Cranial run was performed. There was  TICI 3 revascularization. The guide catheter was retracted to the CCA  and cervical run was performed. The guide catheter was removed. The  groin sheath was exchanged  for an Angio-Seal sheath. The puncture site  was closed with an 8 Fr Angio-Seal system. Sterile dressings were  applied and the patient was transported to the intensive care unit  without incident. Blood loss was 50 cc. Needle counts were correct at  the end of the case. I was present for the entire procedure including  the key portions.     INTERPRETATION OF FILMS:   1. Left internal carotid artery injection, AP and lateral views  cranially: These views show normal cervical and intracranial ICA.  There is occlusive thrombus at the M1 segment distal to the anterior  temporal artery. There is normal flow in the left JOSEPH.    2. Left internal carotid artery injection, AP and lateral views  cranially post first direct aspiration: These views show persistent  occlusion of the M1 segment. There is no vasospasm or contrast  extravasation.  3. Left internal carotid artery injection, AP and lateral views  cranially post second direct aspiration: These views show full  recanalization of the left M1 segment. There is normal flow through  the left MCA including the distal branches. There is no contrast  extravasation or vasospasm.  4. Left common carotid artery injection, AP and lateral views  cervically final run: These views show normal CCA. There is 60%  stenosis by NASCET criteria of the left proximal ICA due to calcified  atherosclerotic disease.      Impression    IMPRESSION:     1) Left MCA occlusion at the M1 segment.   2) Two passes with direct aspiration performed with TICI 3 achieved.   3) Left proximal ICA 60% stenosis due to calcified atherosclerotic  disease.     LORY AGUILA MD   CT Head w/o Contrast    Narrative    EXAM: CT HEAD W/O CONTRAST  LOCATION: [facility]  DATE/TIME: 8/4/2019 2:31 AM    INDICATION: Stroke, follow up  COMPARISON: 08/04/2019  TECHNIQUE: Routine without IV contrast. Multiplanar reformats. Dose reduction techniques were used.    FINDINGS:  INTRACRANIAL CONTENTS: There is a 10 x  5.7 x 4 cm (AP by CC by TR) broad zone of hyperdensity in the right MCA territory, presumably contrast. Area of evolving infarction. Some degree of petechial hemorrhage and be difficult to exclude. No mass effect.   Slight ex vacuo dilatation of the right lateral ventricle. Mild generalized volume loss. No hydrocephalus.     VISUALIZED ORBITS/SINUSES/MASTOIDS: No significant orbital abnormality. No significant paranasal sinus mucosal disease. No significant middle ear or mastoid effusion.    BONES/SOFT TISSUES: No significant abnormality.      Impression    CONCLUSION:  1.  Broad zone of hyperdensity in the right MCA territory, presumably contrast blush within a large evolving ischemic infarct. Some degree of petechial hemorrhage would be difficult to exclude and follow-up to resolution is recommended.  2.  No mass effect, midline shift or hydrocephalus.   XR Abdomen Port 1 View    Narrative    PORTABLE ABDOMEN ONE VIEW  8/4/2019 10:26 AM     COMPARISON: None.    HISTORY: Verify feeding tube placement.      Impression    IMPRESSION: Tip and sidehole of the nasogastric tube are below the  left hemidiaphragm in the stomach. No feeding tube identified. No  evidence for bowel obstruction or free air.    LEONARDO LEAL MD   MR Brain w/o Contrast    Narrative    MRI BRAIN WITHOUT CONTRAST  8/4/2019 6:52 PM    HISTORY:  Recent right MCA stroke, new left MCA stroke status post  thrombectomy and TPA.    TECHNIQUE: Multiplanar, multisequence MRI of the brain without  gadolinium IV contrast material.    COMPARISON:  CT studies dated 8/4/2019    FINDINGS: Diffusion-weighted images reveal an area of restricted  diffusion in the left insula and left basal ganglia regions. There are  also areas of restricted diffusion in the right frontal lobe. This  involves the anterolateral cortex of the right frontal lobe, and the  medial cortex of the right frontal lobe. Small punctate areas of  restricted diffusion are seen in the left  frontal cortex and left  parietal cortex. Areas of restricted diffusion in the right and left  frontal lobes. Gradient-echo sequences shows susceptibility  hypointensity consistent with blood breakdown products in the right  and left frontal lobes. T1 hyperintensity is also seen in the right  frontal lobe. This is consistent with meth hemoglobin products within  the area of the evolving infarct. There is mild mass effect on the  frontal horn of the left lateral ventricle, but no shift of midline  structures. The facial structures appear normal. The arteries at the  base of the brain and the dural venous sinuses appear patent.      Impression    IMPRESSION:  1.  Multiple areas of restricted diffusion in the right and left  frontal lobes, the left basal ganglia, and in the cortex of the left  parietal lobe. These are consistent with evolving infarcts.   2. There are susceptibility hypointensities in both the right and left  frontal lobes and left basal ganglia. This is likely due to petechial  hemorrhage. This corresponds to the areas of high density seen on the  previous head CT.  3. Mild mass effect on the left lateral ventricle, but no significant  shift of midline structures.    CURTIS AVILA MD   CT Head w/o Contrast    Narrative    CT SCAN OF THE HEAD WITHOUT CONTRAST   8/6/2019 6:14 AM     HISTORY: Stroke, follow-up.    TECHNIQUE:  Axial images of the head and coronal reformations without  IV contrast material.  Radiation dose for this scan was reduced using  automated exposure control, adjustment of the mA and/or kV according  to patient size, or iterative reconstruction technique.    COMPARISON: 8/4/2019    FINDINGS: Evolving bilateral middle cerebral artery territory ischemic  infarcts are present. There is a 4 to 5 cm area of low density in the  left middle cerebral artery distribution involving the majority left  basal ganglia region as well as the subinsular white matter and  cortex. On the right, there is  involvement of the right frontal lobe  as well as the insular and subinsular regions. There is no evidence  for hemorrhagic transformation. Mild cerebral atrophy is present.  There is no midline shift. No hydrocephalus.      Impression    IMPRESSION: Evolving bilateral middle cerebral artery ischemic  infarcts. No evidence for any hemorrhagic transformation or  significant mass effect.    JESSICA ESQUEDA MD     Most Recent 3 CBC's:  Recent Labs   Lab Test 08/09/19  0458 08/08/19  0513 08/07/19  0540   WBC 12.5* 15.8* 23.0*   HGB 11.3* 10.6* 10.6*   MCV 91 91 91    292 298     Most Recent 3 BMP's:  Recent Labs   Lab Test 08/09/19  0458 08/08/19  0513 08/07/19  0540    143 143   POTASSIUM 3.6 3.7 3.3*   CHLORIDE 109 110* 110*   CO2 28 28 25   BUN 21 19 13   CR 0.84 0.81 0.86   ANIONGAP 7 5 8   KADEEM 8.9 8.6 8.4*   * 155* 108*

## 2023-08-31 NOTE — PROGRESS NOTES
"Care Coordination:    Asked by Bedside RN to help facilitate a care conference. Placed call to spouse, Rad. Quincy answered the phone, stated, \"My dad is sleeping but you can talk to me.\" Writer explained there was a request to have a care conference tomorrow and time needs to be arranged. time. Quincy stated,\" late morning works best.\" 11:30am was agreed upon. Placed call to Amber, she agreed to 11:30am as well. Relayed to bedside RN and family member in room, Rad.  He stated, \"All of her children and grandchildren will be there.\"    Stella Amador RN BSN  [facility] Care Coordinator  Phone [phone]    " TRIAGE  Chief Complaint   Patient presents with    Chest Pain      Pt arrived to ED from [facility] for chest pressure, pt reports started this morning between 8am-9am. Pt has been recently seen for similar discomforts but reports today was worse pressure.  Currently a 2/10.  Pt was treated previously with prolisec and GI cocktail and felt better.Pt reports he does have a hx of GERD and did have coffee this morning that worsens his acid production. Pt denies dizziness, lightheadedness, SOB, N/V/D and HA. Past med hx of anxiety.     324mg of ASA given by EMS in route.

## (undated) RX ORDER — FENTANYL CITRATE 50 UG/ML
INJECTION, SOLUTION INTRAMUSCULAR; INTRAVENOUS
Status: DISPENSED
Start: 2019-01-01

## (undated) RX ORDER — PROPOFOL 10 MG/ML
INJECTION, EMULSION INTRAVENOUS
Status: DISPENSED
Start: 2019-01-01